# Patient Record
Sex: MALE | Race: WHITE | Employment: OTHER | URBAN - METROPOLITAN AREA
[De-identification: names, ages, dates, MRNs, and addresses within clinical notes are randomized per-mention and may not be internally consistent; named-entity substitution may affect disease eponyms.]

---

## 2017-01-19 ENCOUNTER — OFFICE VISIT (OUTPATIENT)
Dept: TRANSPLANT | Facility: CLINIC | Age: 57
End: 2017-01-19
Attending: INTERNAL MEDICINE
Payer: COMMERCIAL

## 2017-01-19 ENCOUNTER — INFUSION THERAPY VISIT (OUTPATIENT)
Dept: ONCOLOGY | Facility: CLINIC | Age: 57
End: 2017-01-19
Attending: INTERNAL MEDICINE
Payer: COMMERCIAL

## 2017-01-19 VITALS
TEMPERATURE: 98.3 F | BODY MASS INDEX: 28.23 KG/M2 | WEIGHT: 220 LBS | DIASTOLIC BLOOD PRESSURE: 82 MMHG | HEART RATE: 114 BPM | OXYGEN SATURATION: 96 % | SYSTOLIC BLOOD PRESSURE: 133 MMHG | RESPIRATION RATE: 18 BRPM

## 2017-01-19 DIAGNOSIS — C81.90 HODGKIN'S DISEASE (H): ICD-10-CM

## 2017-01-19 DIAGNOSIS — C81.18 NODULAR SCLEROSIS HODGKIN LYMPHOMA OF LYMPH NODES OF MULTIPLE REGIONS (H): Primary | ICD-10-CM

## 2017-01-19 LAB
ALBUMIN SERPL-MCNC: 3.6 G/DL (ref 3.4–5)
ALP SERPL-CCNC: 119 U/L (ref 40–150)
ALT SERPL W P-5'-P-CCNC: 62 U/L (ref 0–70)
ANION GAP SERPL CALCULATED.3IONS-SCNC: 11 MMOL/L (ref 3–14)
AST SERPL W P-5'-P-CCNC: 43 U/L (ref 0–45)
BASOPHILS # BLD AUTO: 0.1 10E9/L (ref 0–0.2)
BASOPHILS NFR BLD AUTO: 0.7 %
BILIRUB SERPL-MCNC: 0.6 MG/DL (ref 0.2–1.3)
BUN SERPL-MCNC: 22 MG/DL (ref 7–30)
CALCIUM SERPL-MCNC: 8.6 MG/DL (ref 8.5–10.1)
CHLORIDE SERPL-SCNC: 108 MMOL/L (ref 94–109)
CO2 SERPL-SCNC: 24 MMOL/L (ref 20–32)
CREAT SERPL-MCNC: 1.05 MG/DL (ref 0.66–1.25)
DIFFERENTIAL METHOD BLD: ABNORMAL
EOSINOPHIL # BLD AUTO: 0 10E9/L (ref 0–0.7)
EOSINOPHIL NFR BLD AUTO: 0.1 %
ERYTHROCYTE [DISTWIDTH] IN BLOOD BY AUTOMATED COUNT: 15.6 % (ref 10–15)
GFR SERPL CREATININE-BSD FRML MDRD: 73 ML/MIN/1.7M2
GLUCOSE SERPL-MCNC: 93 MG/DL (ref 70–99)
HCT VFR BLD AUTO: 40.8 % (ref 40–53)
HGB BLD-MCNC: 14.3 G/DL (ref 13.3–17.7)
IMM GRANULOCYTES # BLD: 0.1 10E9/L (ref 0–0.4)
IMM GRANULOCYTES NFR BLD: 0.6 %
LYMPHOCYTES # BLD AUTO: 3.4 10E9/L (ref 0.8–5.3)
LYMPHOCYTES NFR BLD AUTO: 37.5 %
MCH RBC QN AUTO: 32.1 PG (ref 26.5–33)
MCHC RBC AUTO-ENTMCNC: 35 G/DL (ref 31.5–36.5)
MCV RBC AUTO: 92 FL (ref 78–100)
MONOCYTES # BLD AUTO: 1.1 10E9/L (ref 0–1.3)
MONOCYTES NFR BLD AUTO: 12 %
NEUTROPHILS # BLD AUTO: 4.5 10E9/L (ref 1.6–8.3)
NEUTROPHILS NFR BLD AUTO: 49.1 %
NRBC # BLD AUTO: 0 10*3/UL
NRBC BLD AUTO-RTO: 0 /100
PLATELET # BLD AUTO: 153 10E9/L (ref 150–450)
POTASSIUM SERPL-SCNC: 4.2 MMOL/L (ref 3.4–5.3)
PROT SERPL-MCNC: 7.4 G/DL (ref 6.8–8.8)
RBC # BLD AUTO: 4.46 10E12/L (ref 4.4–5.9)
SODIUM SERPL-SCNC: 143 MMOL/L (ref 133–144)
WBC # BLD AUTO: 9.1 10E9/L (ref 4–11)

## 2017-01-19 PROCEDURE — 96413 CHEMO IV INFUSION 1 HR: CPT

## 2017-01-19 PROCEDURE — 80053 COMPREHEN METABOLIC PANEL: CPT | Performed by: INTERNAL MEDICINE

## 2017-01-19 PROCEDURE — 87799 DETECT AGENT NOS DNA QUANT: CPT | Performed by: INTERNAL MEDICINE

## 2017-01-19 PROCEDURE — 85025 COMPLETE CBC W/AUTO DIFF WBC: CPT | Performed by: INTERNAL MEDICINE

## 2017-01-19 PROCEDURE — 25000128 H RX IP 250 OP 636: Mod: ZF | Performed by: INTERNAL MEDICINE

## 2017-01-19 RX ADMIN — BRENTUXIMAB VEDOTIN 178 MG: 50 INJECTION, POWDER, LYOPHILIZED, FOR SOLUTION INTRAVENOUS at 17:18

## 2017-01-19 RX ADMIN — SODIUM CHLORIDE 250 ML: 9 INJECTION, SOLUTION INTRAVENOUS at 17:18

## 2017-01-19 NOTE — PROGRESS NOTES
[facility] ONCOLOGY CLINIC NOTE      CHIEF COMPLAINT:  The patient reports feeling well.  He did have sharp neurological pain in his forearm about a week ago that resolved after hydrating himself and drinking fluids.  Otherwise, no such recurrent symptoms, and no numbness or tingling.      PATIENT IDENTIFICATION:  Mr. Stoddard is a 56-year-old gentleman with relapsed refractory Hodgkin's lymphoma, status post non-myeloablative double umbilical cord blood transplant on 06/11/2013.  He did have disease recurrence; however, required no specific therapy, and was on a watch-and-wait approach for several years.  Of note, he did have EBV viremia with lymphadenopathy in the early post-transplant period of time, treated with Rituxan on 2 separate occasions with improvement of lymphadenopathy symptoms.  He also had CMV pneumonia in the early post-transplant period of time that was also successfully treated with antiviral therapy.  He had an excisional lymph node biopsy for an enlarging axillary lymph node on 11/14/2016 that confirmed again recurrent Hodgkin's disease.  PET scan demonstrated increased FDG avidity in multiple axillary lymph nodes, the largest measuring between 2.5-3 cm.  In addition, he was noticed to have a subcarinal lymph node that was enlarged with shotty cervical lymphadenopathy or suggestive of systemic disease recurrence.  Subsequently, we made the decision to start him on brentuximab given the increased lymphadenopathy issues, and the first cycle was initiated on 12/05/2016, which he tolerated well (he received the second cycle on 12/29/2016, and today he receives his #3 cycle, which is 01/19/2017).      INTERVAL HISTORY:  Since her last visit, Mr. Stoddard continues to feel well.  He is tolerating brentuximab well without any obvious symptoms of peripheral neuropathy symptoms.  He did have a week ago sharp what felt like radiating neurological pain in his forearm.  However, he was doing active  physical work at that time, and he felt like he did not hydrate himself very well.  After drinking a lot of fluid, the symptoms resolved with no recurrence.  Currently, he reports having no numbness or tingling symptoms. He also reports having no fevers, no chills, and no other infectious signs or symptoms.  He has no GI symptoms either.  He reports having no skin rash, and no pain issue or any bleeding issues.  He does report, however, some hair loss since he started the brentuximab; however, it is not significant.      REVIEW OF SYSTEMS:  A 12-point review of systems was reviewed and was otherwise unremarkable.      PHYSICAL EXAMINATION:   VITAL SIGNS:  Temperature 98.3 Fahrenheit, respiratory rate is 18, his pulse is 114, and his blood pressure is 133/82.  He is saturating 96% on room air.  His weight remains relatively stable at 99.8 kg.   GENERAL:  He is very pleasant, in no apparent distress.   HEAD AND NECK:  Pupils are equally round to light.  Oropharynx is clear without lesions.   CHEST:  Clear to auscultation bilaterally, no wheezes, no crackles.   CARDIAC:  Regular rate and rhythm, no murmurs, rubs or gallops.   CARDIOVASCULAR:  Regular rate and rhythm, no murmurs, rubs or gallops.   ABDOMEN:  Positive bowel sounds, soft, nontender and nondistended.  No palpable hepatosplenomegaly.   EXTREMITIES:  No pedal edema bilaterally.   SKIN:  No rash.   LYMPH NODES:  He has no palpable cervical lymphadenopathy on my examination today.  I am unable to palpate his axillary lymphadenopathy either, which actually is suggestive of a good response to chemotherapy.  He has no palpable inguinal lymph nodes either.   NEUROLOGIC:  Remains grossly intact.      LABORATORY DATA:  Summarized below.  Of note, he has no neutropenia related to brentuximab.   Lab Results   Component Value Date    WBC 9.1 01/19/2017    ANEU 4.5 01/19/2017    HGB 14.3 01/19/2017    HCT 40.8 01/19/2017     01/19/2017     01/19/2017     POTASSIUM 4.2 01/19/2017    CHLORIDE 108 01/19/2017    CO2 24 01/19/2017    GLC 93 01/19/2017    BUN 22 01/19/2017    CR 1.05 01/19/2017    MAG 2.3 09/17/2013    INR 1.05 06/30/2014        ASSESSMENT AND PLAN:  Mr. Stoddard is a 56-year-old gentleman with relapsed refractory Hodgkin's lymphoma, status post  non-myeloablative double cord umbilical cord blood transplant in 2013 with a recurrence of his Hodgkin's lymphoma requiring brentuximab therapy.  At this time, he presents for cycle #3 of therapy.   1.  Relapsed Hodgkin's lymphoma.  The patient is tolerating brentuximab rather well.  He is receiving cycle #3 of treatment today (01/19/2017).  No obvious signs or symptoms of any worsening neuropathy symptoms.  We will plan on obtaining CT imaging after this cycle, and he prefers it to be done when he presented 3 weeks later for his cycle 4 of therapy to make sure he is not taking too much time off from his work.  His EBV titer is pending from today.  Of note, that was elevated when he had evidence of disease progression with increased lymphadenopathy.  I recommended stopping allopurinol since on exam he had a good response to therapy, and no evidence of palpable lymphadenopathy.  His next cycle of #4 brentuximab will be given 3 weeks from now.   2.  Hematology.  His peripheral blood counts are normal, and he is not neutropenic; therefore, it is okay to proceed with his chemotherapy today.   3.  Infectious disease.  He has no active infectious issues at this time.  Previously for EBV viremia, he required Rituxan with good response to treatment, and an EBV titer is pending from today.   4.  Renal.  He had mild STEFANIA prior to starting chemotherapy.  However, this is now resolved.  Therefore, allopurinol is being stopped.  I recommended him increasing his p.o. fluid intake regardless while receiving chemotherapy, in addition when he is going to come for his CT imaging in 3 weeks.   5.  GVHD.  He has no signs or  symptoms of kjlor-oouils-ivuz disease to date.      RTC in 3 weeks for labs, CT scan, cycle #4 of brentuximab and a visit with Karson.      Amy Ty MD     Hematology, Oncology and Transplantation     [facility]

## 2017-01-19 NOTE — Clinical Note
1/19/2017      RE: Brando Stoddard  [address]  [facility] [address]     Dear Colleague,    Thank you for referring your patient, Brando Stoddard, to the [facility] BLOOD [facility]ARR[facility]PL[facility]. Please see a copy of my visit note below.    [facility] ONCOLOGY CLINIC NOTE      CHIEF COMPLAINT:  The patient reports feeling well.  He did have sharp neurological pain in his forearm about a week ago that resolved after hydrating himself and drinking fluids.  Otherwise, no such recurrent symptoms, and no numbness or tingling.      PATIENT IDENTIFICATION:  Mr. Stoddard is a 56-year-old gentleman with relapsed refractory Hodgkin's lymphoma, status post non-myeloablative double umbilical cord blood transplant on 06/11/2013.  He did have disease recurrence; however, required no specific therapy, and was on a watch-and-wait approach for several years.  Of note, he did have EBV viremia with lymphadenopathy in the early post-transplant period of time, treated with Rituxan on 2 separate occasions with improvement of lymphadenopathy symptoms.  He also had CMV pneumonia in the early post-transplant period of time that was also successfully treated with antiviral therapy.  He had an excisional lymph node biopsy for an enlarging axillary lymph node on 11/14/2016 that confirmed again recurrent Hodgkin's disease.  PET scan demonstrated increased FDG avidity in multiple axillary lymph nodes, the largest measuring between 2.5-3 cm.  In addition, he was noticed to have a subcarinal lymph node that was enlarged with shotty cervical lymphadenopathy or suggestive of systemic disease recurrence.  Subsequently, we made the decision to start him on brentuximab given the increased lymphadenopathy issues, and the first cycle was initiated on 12/05/2016, which he tolerated well (he received the second cycle on 12/29/2016, and today he receives his #3 cycle, which is 01/19/2017).      INTERVAL HISTORY:  Since her last visit,   Richi continues to feel well.  He is tolerating brentuximab well without any obvious symptoms of peripheral neuropathy symptoms.  He did have a week ago sharp what felt like radiating neurological pain in his forearm.  However, he was doing active physical work at that time, and he felt like he did not hydrate himself very well.  After drinking a lot of fluid, the symptoms resolved with no recurrence.  Currently, he reports having no numbness or tingling symptoms. He also reports having no fevers, no chills, and no other infectious signs or symptoms.  He has no GI symptoms either.  He reports having no skin rash, and no pain issue or any bleeding issues.  He does report, however, some hair loss since he started the brentuximab; however, it is not significant.      REVIEW OF SYSTEMS:  A 12-point review of systems was reviewed and was otherwise unremarkable.      PHYSICAL EXAMINATION:   VITAL SIGNS:  Temperature 98.3 Fahrenheit, respiratory rate is 18, his pulse is 114, and his blood pressure is 133/82.  He is saturating 96% on room air.  His weight remains relatively stable at 99.8 kg.   GENERAL:  He is very pleasant, in no apparent distress.   HEAD AND NECK:  Pupils are equally round to light.  Oropharynx is clear without lesions.   CHEST:  Clear to auscultation bilaterally, no wheezes, no crackles.   CARDIAC:  Regular rate and rhythm, no murmurs, rubs or gallops.   CARDIOVASCULAR:  Regular rate and rhythm, no murmurs, rubs or gallops.   ABDOMEN:  Positive bowel sounds, soft, nontender and nondistended.  No palpable hepatosplenomegaly.   EXTREMITIES:  No pedal edema bilaterally.   SKIN:  No rash.   LYMPH NODES:  He has no palpable cervical lymphadenopathy on my examination today.  I am unable to palpate his axillary lymphadenopathy either, which actually is suggestive of a good response to chemotherapy.  He has no palpable inguinal lymph nodes either.   NEUROLOGIC:  Remains grossly intact.      LABORATORY  DATA:  Summarized below.  Of note, he has no neutropenia related to brentuximab.   Lab Results   Component Value Date    WBC 9.1 01/19/2017    ANEU 4.5 01/19/2017    HGB 14.3 01/19/2017    HCT 40.8 01/19/2017     01/19/2017     01/19/2017    POTASSIUM 4.2 01/19/2017    CHLORIDE 108 01/19/2017    CO2 24 01/19/2017    GLC 93 01/19/2017    BUN 22 01/19/2017    CR 1.05 01/19/2017    MAG 2.3 09/17/2013    INR 1.05 06/30/2014        ASSESSMENT AND PLAN:  Mr. Stoddard is a 56-year-old gentleman with relapsed refractory Hodgkin's lymphoma, status post  non-myeloablative double cord umbilical cord blood transplant in 2013 with a recurrence of his Hodgkin's lymphoma requiring brentuximab therapy.  At this time, he presents for cycle #3 of therapy.   1.  Relapsed Hodgkin's lymphoma.  The patient is tolerating brentuximab rather well.  He is receiving cycle #3 of treatment today (01/19/2017).  No obvious signs or symptoms of any worsening neuropathy symptoms.  We will plan on obtaining CT imaging after this cycle, and he prefers it to be done when he presented 3 weeks later for his cycle 4 of therapy to make sure he is not taking too much time off from his work.  His EBV titer is pending from today.  Of note, that was elevated when he had evidence of disease progression with increased lymphadenopathy.  I recommended stopping allopurinol since on exam he had a good response to therapy, and no evidence of palpable lymphadenopathy.  His next cycle of #4 brentuximab will be given 3 weeks from now.   2.  Hematology.  His peripheral blood counts are normal, and he is not neutropenic; therefore, it is okay to proceed with his chemotherapy today.   3.  Infectious disease.  He has no active infectious issues at this time.  Previously for EBV viremia, he required Rituxan with good response to treatment, and an EBV titer is pending from today.   4.  Renal.  He had mild STEFANIA prior to starting chemotherapy.  However, this is  now resolved.  Therefore, allopurinol is being stopped.  I recommended him increasing his p.o. fluid intake regardless while receiving chemotherapy, in addition when he is going to come for his CT imaging in 3 weeks.   5.  GVHD.  He has no signs or symptoms of llomr-oacmkg-hpkv disease to date.      RTC in 3 weeks for labs, CT scan, cycle #4 of brentuximab and a visit with Karson.      Amy Ty MD     Hematology, Oncology and Transplantation     [facility]

## 2017-01-19 NOTE — PROGRESS NOTES
Chief Complaint   Patient presents with     Blood Draw     labs drawn preipherally by RN     Labs drawn from right arm AC.  Patient checked in for infusion visit.  Lucila Pelaez RN

## 2017-01-19 NOTE — PROGRESS NOTES
Infusion Nursing Note:  Brando Stoddard presents today for Cycle 3 Day 1 Brentuximab.    Patient seen by provider today: Yes: Dr. Amy Ty in infusion today.    Intravenous Access:  Peripheral IV placed.    Treatment Conditions:  HGB     14.3   1/19/2017  WBC      9.1   1/19/2017   ANEU      4.5   1/19/2017  PLT      153   1/19/2017     NA      143   1/19/2017                POTASSIUM      4.2   1/19/2017        MAG      2.3   9/17/2013         CR     1.05   1/19/2017  CR     1.08   5/15/2013                MEÑO      8.6   1/19/2017             BILITOTAL      0.6   1/19/2017        ALBUMIN      3.6   1/19/2017                 ALT       62   1/19/2017        AST       43   1/19/2017  Results reviewed, labs MET treatment parameters, ok to proceed with treatment.    Post Infusion Assessment:  Patient tolerated infusion without incident.  Blood return noted pre and post infusion.  Access discontinued per protocol.    Discharge Plan:   Patient declined prescription refills.  Discharge instructions reviewed with: Patient.  Patient and/or family verbalized understanding of discharge instructions and all questions answered.  Patient states he will call to schedule next appointment.  Patient discharged in stable condition accompanied by: self.  Departure Mode: Ambulatory.    TODD FLORES RN

## 2017-01-20 LAB
EBV DNA # SPEC NAA+PROBE: ABNORMAL {COPIES}/ML
EBV DNA SPEC NAA+PROBE-LOG#: 4 {LOG_COPIES}/ML

## 2017-02-09 ENCOUNTER — OFFICE VISIT (OUTPATIENT)
Dept: TRANSPLANT | Facility: CLINIC | Age: 57
End: 2017-02-09
Attending: INTERNAL MEDICINE
Payer: COMMERCIAL

## 2017-02-09 ENCOUNTER — INFUSION THERAPY VISIT (OUTPATIENT)
Dept: ONCOLOGY | Facility: CLINIC | Age: 57
End: 2017-02-09
Attending: INTERNAL MEDICINE
Payer: COMMERCIAL

## 2017-02-09 DIAGNOSIS — C81.18 NODULAR SCLEROSIS HODGKIN LYMPHOMA OF LYMPH NODES OF MULTIPLE REGIONS (H): Primary | ICD-10-CM

## 2017-02-09 DIAGNOSIS — C81.04 NODULAR LYMPHOCYTE PREDOMINANT HODGKIN LYMPHOMA OF LYMPH NODES OF AXILLA (H): ICD-10-CM

## 2017-02-09 DIAGNOSIS — J18.9 PNEUMONIA OF BOTH LOWER LOBES DUE TO INFECTIOUS ORGANISM: ICD-10-CM

## 2017-02-09 DIAGNOSIS — C81.04 NODULAR LYMPHOCYTE PREDOMINANT HODGKIN LYMPHOMA OF LYMPH NODES OF AXILLA (H): Primary | ICD-10-CM

## 2017-02-09 LAB
ALBUMIN SERPL-MCNC: 3.6 G/DL (ref 3.4–5)
ALP SERPL-CCNC: 160 U/L (ref 40–150)
ALT SERPL W P-5'-P-CCNC: 49 U/L (ref 0–70)
ANION GAP SERPL CALCULATED.3IONS-SCNC: 11 MMOL/L (ref 3–14)
AST SERPL W P-5'-P-CCNC: 32 U/L (ref 0–45)
BASOPHILS # BLD AUTO: 0 10E9/L (ref 0–0.2)
BASOPHILS NFR BLD AUTO: 0.3 %
BILIRUB SERPL-MCNC: 0.5 MG/DL (ref 0.2–1.3)
BUN SERPL-MCNC: 28 MG/DL (ref 7–30)
CALCIUM SERPL-MCNC: 8.9 MG/DL (ref 8.5–10.1)
CHLORIDE SERPL-SCNC: 106 MMOL/L (ref 94–109)
CO2 SERPL-SCNC: 24 MMOL/L (ref 20–32)
CREAT SERPL-MCNC: 1.23 MG/DL (ref 0.66–1.25)
DIFFERENTIAL METHOD BLD: ABNORMAL
EOSINOPHIL # BLD AUTO: 0 10E9/L (ref 0–0.7)
EOSINOPHIL NFR BLD AUTO: 0 %
ERYTHROCYTE [DISTWIDTH] IN BLOOD BY AUTOMATED COUNT: 16 % (ref 10–15)
GFR SERPL CREATININE-BSD FRML MDRD: 61 ML/MIN/1.7M2
GLUCOSE SERPL-MCNC: 150 MG/DL (ref 70–99)
HCT VFR BLD AUTO: 39 % (ref 40–53)
HGB BLD-MCNC: 13.4 G/DL (ref 13.3–17.7)
IMM GRANULOCYTES # BLD: 0.1 10E9/L (ref 0–0.4)
IMM GRANULOCYTES NFR BLD: 0.7 %
LDH SERPL L TO P-CCNC: 270 U/L (ref 85–227)
LYMPHOCYTES # BLD AUTO: 2 10E9/L (ref 0.8–5.3)
LYMPHOCYTES NFR BLD AUTO: 16.8 %
MCH RBC QN AUTO: 31.8 PG (ref 26.5–33)
MCHC RBC AUTO-ENTMCNC: 34.4 G/DL (ref 31.5–36.5)
MCV RBC AUTO: 92 FL (ref 78–100)
MONOCYTES # BLD AUTO: 0.3 10E9/L (ref 0–1.3)
MONOCYTES NFR BLD AUTO: 2.4 %
NEUTROPHILS # BLD AUTO: 9.5 10E9/L (ref 1.6–8.3)
NEUTROPHILS NFR BLD AUTO: 79.8 %
NRBC # BLD AUTO: 0 10*3/UL
NRBC BLD AUTO-RTO: 0 /100
PLATELET # BLD AUTO: 185 10E9/L (ref 150–450)
POTASSIUM SERPL-SCNC: 4.4 MMOL/L (ref 3.4–5.3)
PROT SERPL-MCNC: 7.9 G/DL (ref 6.8–8.8)
RBC # BLD AUTO: 4.22 10E12/L (ref 4.4–5.9)
SODIUM SERPL-SCNC: 142 MMOL/L (ref 133–144)
WBC # BLD AUTO: 11.9 10E9/L (ref 4–11)

## 2017-02-09 PROCEDURE — 85025 COMPLETE CBC W/AUTO DIFF WBC: CPT | Performed by: INTERNAL MEDICINE

## 2017-02-09 PROCEDURE — 36415 COLL VENOUS BLD VENIPUNCTURE: CPT | Performed by: INTERNAL MEDICINE

## 2017-02-09 PROCEDURE — 87633 RESP VIRUS 12-25 TARGETS: CPT | Performed by: INTERNAL MEDICINE

## 2017-02-09 PROCEDURE — 83615 LACTATE (LD) (LDH) ENZYME: CPT | Performed by: INTERNAL MEDICINE

## 2017-02-09 PROCEDURE — 80053 COMPREHEN METABOLIC PANEL: CPT | Performed by: INTERNAL MEDICINE

## 2017-02-09 RX ORDER — LEVOFLOXACIN 750 MG/1
750 TABLET, FILM COATED ORAL DAILY
Qty: 7 TABLET | Refills: 0 | Status: SHIPPED | OUTPATIENT
Start: 2017-02-09 | End: 2017-03-16

## 2017-02-09 NOTE — PROGRESS NOTES
Infusion Nursing Note:  Brando Stoddard presents today for cycle 4 day 1 Brentuximab - Deferred.    Patient seen by provider today: Yes: Dr. Ty   present during visit today: Not Applicable.    Note: Pt reports having an upper respiratory infection that he was seen for on 2/7/17. He was started on prednisone, an albuterol inhaler, azithromycin, and codeine cough syrup.    TORB: Dr. Ty/Stephanie Monique RN: Defer chemotherapy for 2 weeks, nasal swab for RSV, pt okay to discharge to home.    Treatment Conditions:  HGB     13.4   2/9/2017  WBC     11.9   2/9/2017   ANEU      9.5   2/9/2017  PLT      185   2/9/2017     NA      142   2/9/2017                POTASSIUM      4.4   2/9/2017        MAG      2.3   9/17/2013         CR     1.23   2/9/2017  CR     1.08   5/15/2013                MEÑO      8.9   2/9/2017             BILITOTAL      0.5   2/9/2017        ALBUMIN      3.6   2/9/2017                 ALT       49   2/9/2017        AST       32   2/9/2017    Discharge Plan:   Patient declined prescription refills.  Discharge instructions reviewed with: Patient.  Patient and/or family verbalized understanding of discharge instructions and all questions answered.  AVS to patient via [employer].  Patient will return 2/23/17 for next appointment.   Patient discharged in stable condition accompanied by: wife.  Departure Mode: Ambulatory.    Stephanie Monique RN

## 2017-02-09 NOTE — LETTER
2/9/2017      RE: Brando Stoddard  [address]  [place] [address]     Dear Colleague,    Thank you for referring your patient, Brando Stoddard, to the [facility] BLOOD AND MARROW TRANSPLANT. Please see a copy of my visit note below.    [facility] ONCOLOGY CLINIC NOTE       CHIEF COMPLAINT:  The patient reports having hair loss.  In addition, he has been having URI symptoms and productive cough for several days.       PATIENT IDENTIFICATION:  Mr. 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  He had post-transplant relapse early on.  However, we opted to do a watch-and-wait approach for several years because his lymphadenopathy was relatively stable.  At the same time, he was noticed to have EBV viremia and previously on two separate occasions he was treated with a Rituxan course with improvement of lymphadenopathy and resolution of EBV viremia symptoms.  Most recently on a PET/CT he was noticed to have a further enlargement of lymphadenopathy with axillary lymph nodes measuring 2.5-3 cm.  In addition, he was noticed to have subcarinal lymph nodes with also shotty cervical lymphadenopathy suggestive of systemic disease recurrence.  Brentuximab was initiated on 12/05/2016, the second cycle was on 12/29/2016, and the last one was on 01/19/2017.  He had restaging CT imaging today and presents for a followup visit and to start cycle number 4.       INTERVAL HISTORY:  Since his last visit, Mr. Stoddard for the last several days has been having URI symptoms as well as chest congestion with a productive cough.  He was seen in [facility] and was prescribed prednisone and was started on Zithromax for suspected bronchitis.  Chest x-ray showed no pneumonia  there.  On today's evaluation he reports that several  family members had viral URI symptoms as well which resolved in those patients.  However, he now is sick with a productive cough, no fevers; however, he has had exertional shortness of breath.  He reports having no chills.  He has no GI symptoms and no peripheral neuropathy symptoms, either.  CT chest today showed ground-glass opacities bilaterally with atypical infection being suspected.  The patient reports having no bleeding issues and no pain issues.       REVIEW OF SYSTEMS:  A 12-point review of systems was reviewed and was otherwise unremarkable other than reported in chief complaint and interval history.       PHYSICAL EXAMINATION:   VITAL SIGNS:  His temperature is 98.3 Fahrenheit, respiratory rate is 18, heart rate 114, blood pressure 133/82, saturating 96% in room air.   GENERAL:  He is very pleasant, in no apparent distress.   HEAD AND NECK:  Pupils are equally round to light.  Oropharynx is clear without lesions.   CARDIAC:  Regular rate and rhythm.  No murmurs, rubs or gallops.   ABDOMEN:  Positive bowel sounds, soft, nontender, nondistended, without palpable hepatosplenomegaly.   EXTREMITIES:  No pedal edema bilaterally.   LYMPH NODES:  I was unable to palpate his cervical or axillary lymphadenopathy today.  He also has no palpable inguinal lymph nodes.   NEUROLOGIC:  Nonfocal.       LABORATORY DATA:  Summarized below.  He was noticed to have slight leukocytosis which is likely related to prednisone use.      ASSESSMENT AND PLAN:  Mr. Stoddard is a 56-year-old gentleman with relapsed Hodgkin lymphoma, status post non-myeloablative double umbilical cord blood transplant in 2013 who is being treated with brentuximab and has completed 3 cycles of therapy so far.    1.  Relapsed Hodgkin lymphoma.  He completed 3 cycles of chemotherapy, the last one on 01/19/2017.  His interim imaging demonstrates reduced left axillary lymphadenopathy corresponding to treatment response.  No lymphadenopathy otherwise was  followed in the reminder of the chest other than cervical lymphadenopathy now being decreased down to 1 cm.  He was noticed to have nodular opacities in his lungs with mucous plugging and bronchial thickening suggestive of atypical infection.  I informed the patient that based on this interim imaging, it is clear that he is having a good response to brentuximab.  However, we will hold cycle number 4 of brentuximab therapy today due to ongoing infectious issues.  We will reschedule it 2 weeks from now, whenever his infection is treated and controlled.    2.  Hematology.  His peripheral blood counts are normal other than leukocytosis with neutrophilia, likely related to the use of prednisone or possible underlying bacterial infection.    3.  Infectious disease.  For his symptoms of atypical pneumonia which most likely is viral in etiology, we did a nasopharyngeal swab today for viral PCR testing.  In addition, I switched him from Zithromax to Levaquin for antibacterial coverage to preemptively treat possible superimposed bacterial pneumonia.  The patient was informed to contact us if his symptoms do not improve.    4.  Renal.  He had mild STEFANIA prior to starting chemotherapy.  However, this is resolved and he continues to drink a good amount of fluids.    5.  Rvbqj-imerii-rokf disease.  He has no signs or symptoms of wmgkv-zzbsak-clyt disease to date.       RTC in 2 weeks with labs and visit with Dr. Ty.  If he is doing well, we will start cycle number 4 of brentuximab at that time.         Again, thank you for allowing me to participate in the care of your patient.      Sincerely,    Amy Ty MD

## 2017-02-09 NOTE — PATIENT INSTRUCTIONS
Contact Numbers    [facility] [place]: [phone]  [facility] [place]:  [phone]    Call triage with chills and/or temperature greater than or equal to 100.5, uncontrolled nausea/vomiting, diarrhea, constipation, dizziness, shortness of breath, chest pain, bleeding, unexplained bruising, or any new/concerning symptoms, questions/concerns.     If you are having any concerning symptoms or wish to speak to a provider before your next infusion visit, please call your care coordinator or triage to notify them so we can adequately serve you.       After Hours: [phone]    If after hours, weekends, or holidays, call main hospital  and ask for Oncology doctor on call.         February 2017 Sunday Monday Tuesday Wednesday Thursday Friday Saturday                  1     2     3     4       5     6     7     8     9     CT CHEST ABDOMEN PELVIS WWO    3:00 PM   (20 min.)   UCCT2   [facility] CT     LAB WITH  CLINIC    3:30 PM   (15 min.)    LAB   [facility] Lab     [facility] ONC RETURN    4:30 PM   (30 min.)   Amy Ty MD   [facility] Blood and Marrow Transplant     P ONC INFUSION 60    4:30 PM   (60 min.)    ONCOLOGY INFUSION   [facility] 10     11       12     13     14     15     16     17     18       19     20     21     22     23     [facility] DRAW    2:00 PM   (15 min.)    [facility] DRAW   [facility] Lab Draw     [facility] ONC RETURN    2:30 PM   (30 min.)   Amy Ty MD   [facility] Blood and Marrow Transplant     P ONC INFUSION 60    3:00 PM   (60 min.)    ONCOLOGY INFUSION   [facility] 24     25       26     27     28                                    March 2017 Sunday Monday Tuesday Wednesday Thursday Friday Saturday                  1     2     [facility] DRAW    9:00 AM   (15 min.)    [facility] DRAW   [facility] Lab Draw     [facility] RETURN    9:30 AM   (50 min.)   Juliane Duron PA-C   [facility] ONC INFUSION 60   10:30 AM   (60 min.)    ONCOLOGY INFUSION   [facility] 3     4       5     6     7     8     9     10     11       12     13     14     15     16     17     18       19     20     21     22     23     [facility] DRAW    2:00 PM   (15 min.)   [facility] LAB DRAW   [facility] Lab Draw     [facility] ONC RETURN    2:30 PM   (30 min.)   Amy Ty MD   [facility] Blood and Marrow Transplant     [facility] ONC INFUSION 60    3:00 PM   (60 min.)    ONCOLOGY INFUSION   [facility] 24     25       26     27     28     29     30     31                       Lab Results:  Recent Results (from the past 12 hour(s))   CBC with platelets differential    Collection Time: 02/09/17  3:04 PM   Result Value Ref Range    WBC 11.9 (H) 4.0 - 11.0 10e9/L    RBC Count 4.22 (L) 4.4 - 5.9 10e12/L    Hemoglobin 13.4 13.3 - 17.7 g/dL    Hematocrit 39.0 (L) 40.0 - 53.0 %    MCV 92 78 - 100 fl    MCH 31.8 26.5 - 33.0 pg    MCHC 34.4 31.5 - 36.5 g/dL    RDW 16.0 (H) 10.0 - 15.0 %    Platelet Count 185 150 - 450 10e9/L    Diff Method Automated Method     % Neutrophils 79.8 %    % Lymphocytes 16.8 %    % Monocytes 2.4 %    % Eosinophils 0.0 %    % Basophils 0.3 %    % Immature Granulocytes 0.7 %    Nucleated RBCs 0 0 /100    Absolute Neutrophil 9.5 (H) 1.6 - 8.3 10e9/L    Absolute Lymphocytes 2.0 0.8 - 5.3 10e9/L    Absolute Monocytes 0.3 0.0 - 1.3 10e9/L    Absolute Eosinophils 0.0 0.0 - 0.7 10e9/L    Absolute Basophils 0.0 0.0 - 0.2 10e9/L    Abs Immature Granulocytes 0.1 0 - 0.4 10e9/L    Absolute Nucleated RBC 0.0    Comprehensive metabolic panel    Collection Time: 02/09/17  3:04 PM   Result Value Ref Range    Sodium 142 133 - 144 mmol/L    Potassium 4.4 3.4 - 5.3 mmol/L    Chloride 106 94 - 109 mmol/L    Carbon Dioxide 24 20 - 32 mmol/L    Anion Gap 11 3 - 14 mmol/L    Glucose 150 (H) 70 - 99 mg/dL    Urea Nitrogen 28 7 - 30 mg/dL    Creatinine 1.23 0.66 - 1.25 mg/dL     GFR Estimate 61 >60 mL/min/1.7m2    GFR Estimate If Black 73 >60 mL/min/1.7m2    Calcium 8.9 8.5 - 10.1 mg/dL    Bilirubin Total 0.5 0.2 - 1.3 mg/dL    Albumin 3.6 3.4 - 5.0 g/dL    Protein Total 7.9 6.8 - 8.8 g/dL    Alkaline Phosphatase 160 (H) 40 - 150 U/L    ALT 49 0 - 70 U/L    AST 32 0 - 45 U/L   Lactate Dehydrogenase    Collection Time: 02/09/17  3:04 PM   Result Value Ref Range    Lactate Dehydrogenase 270 (H) 85 - 227 U/L

## 2017-02-09 NOTE — MR AVS SNAPSHOT
After Visit Summary   2/9/2017    Brando Stoddard    MRN: [id number]           Patient Information     Date Of Birth          1960        Visit Information        Provider Department      2/9/2017 4:30 PM Amy Ty MD 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  After hours call the   hospital at [phone] or   [phone]. Ask for the BMT   fellow for pediatric or adult patients            Follow-ups after your visit        Your next 10 appointments already scheduled     Feb 23, 2017  2:00 PM   [facility] Draw with  [facility] DRAW   [facility] Draw ([facility])    [address] [place] [id number]   [phone]            Feb 23, 2017  2:30 PM   RETURN ONC with Amy Ty MD   [facility] Blood and Marrow Transplant ([facility])    [address] [place] [id number]   [phone]            Feb 23, 2017  3:00 PM   Infusion 60 with UC ONCOLOGY INFUSION, UC 15 ATC   [facility] ([facility])    [address] [place] [phone]   [phone]            Mar 02, 2017  9:00 AM   [facility] Draw with UC [facility] DRAW   [facility] Draw ([facility])    [address] [place] [phone]   [phone]            Mar 02, 2017  9:30 AM   (Arrive by 9:15 AM)   Return Visit with Juliane Duron PA-C   [facility]  [facility] ([facility])    [address] [facility]  [address] [facility] [address]   [phone]            Mar 02, 2017 10:30 AM   Infusion 60 with UC ONCOLOGY INFUSION, UC 31 ATC   [facility] ([facility])    [address] [place] [id number]   [phone]            Mar 23, 2017  2:00 PM   [facility] Draw with UC [facility] DRAW   [facility] Lab Draw ([facility])    [address] [place] [id number]   [phone]            Mar 23, 2017  2:30 PM   RETURN ONC with Amy Ty MD   [facility] Blood and Marrow Transplant ([facility])    [address] [place] [id number] [phone]              Who to contact     If you have questions or need follow up information about today's clinic visit or your schedule please contact [facility] directly at [phone].  Normal or non-critical lab and imaging results will be communicated to you by [employer], letter or phone within 4 business days after the clinic has received the results. If you do not hear from us within 7 days, please contact the clinic through [facility] or phone. If you have a critical or abnormal lab result, we will notify you by phone as soon as possible.  Submit refill requests through "[employer] or call your pharmacy and they will forward the refill request to us. Please allow 3 business days for your refill to be completed.          Additional Information About Your Visit        [employer] Information     "[employer] gives you secure access to your electronic health record. If you see a primary care provider, you can also send messages to your care team and make appointments. If you have questions, please call your primary care clinic.  If you do not have a primary care provider, please call [phone] and they will  assist you.        Care EveryWhere ID     This is your Care EveryWhere ID. This could be used by other organizations to access your [place] medical records  [id number]         Blood Pressure from Last 3 Encounters:   01/19/17 133/82   12/29/16 134/91   12/15/16 130/83    Weight from Last 3 Encounters:   01/19/17 99.791 kg (220 lb)   12/29/16 100.064 kg (220 lb 9.6 oz)   12/15/16 99.7 kg (219 lb 12.8 oz)              We Performed the Following     Respiratory Virus Panel by PCR          Today's Medication Changes          These changes are accurate as of: 2/9/17  5:32 PM.  If you have any questions, ask your nurse or doctor.               Start taking these medicines.        Dose/Directions    levofloxacin 750 MG tablet   Commonly known as:  LEVAQUIN   Used for:  Pneumonia of both lower lobes due to infectious organism   Started by:  Amy Ty MD        Dose:  750 mg   Take 1 tablet (750 mg) by mouth daily   Quantity:  7 tablet   Refills:  0         Stop taking these medicines if you haven't already. Please contact your care team if you have questions.     ZITHROMAX Z-ERNST PO   Stopped by:  Amy Ty MD                Where to get your medicines      These medications were sent to [facility] [address] [place] [address] [place] [address] [place] AT [facility] [address]  [address] [place] [address]     Phone:  [phone]    - levofloxacin 750 MG tablet             Recent Review Flowsheet Data     BMT Recent Results Latest Ref Rng 11/29/2016 12/1/2016 12/8/2016 12/15/2016 12/29/2016 1/19/2017 2/9/2017    WBC 4.0 - 11.0 10e9/L - 9.0 7.7 5.5 7.8 9.1 11.9(H)    Hemoglobin 13.3 - 17.7 g/dL - 13.9 14.8 14.4 15.0 14.3 13.4    Platelet Count 150 - 450 10e9/L - 133(L) 132(L) 86(L) 135(L) 153 185    Neutrophils (Absolute) 1.6 - 8.3 10e9/L - 5.5 4.0 2.6 3.0 4.5 9.5(H)    Sodium 133 - 144 mmol/L - 143 - 140 140 143 142    Potassium 3.4 - 5.3 mmol/L - 3.7 - 3.4 4.0 4.2 4.4    Chloride 94 - 109  mmol/L - 109 - 108 107 108 106    Glucose 70 - 99 mg/dL 91 126(H) - 127(H) 113(H) 93 150(H)    Urea Nitrogen 7 - 30 mg/dL - 22 - 13 24 22 28    Creatinine 0.66 - 1.25 mg/dL - 1.31(H) - 1.04 1.20 1.05 1.23    Calcium (Total) 8.5 - 10.1 mg/dL - 8.4(L) - 9.0 8.4(L) 8.6 8.9    Protein (Total) 6.8 - 8.8 g/dL - 7.4 - 7.5 7.5 7.4 7.9    Albumin 3.4 - 5.0 g/dL - 3.7 - 3.4 3.6 3.6 3.6    Alkaline Phosphatase 40 - 150 U/L - 119 - 142 131 119 160(H)    AST 0 - 45 U/L - 33 - 106(H) 37 43 32    ALT 0 - 70 U/L - 36 - 128(H) 68 62 49    MCV 78 - 100 fl - 91 93 91 91 92 92               Primary Care Provider    Md Other Clinic                Thank you!     Thank you for choosing [facility]  for your care. Our goal is always to provide you with excellent care. Hearing back from our patients is one way we can continue to improve our services. Please take a few minutes to complete the written survey that you may receive in the mail after your visit with us. Thank you!             Your Updated Medication List - Protect others around you: Learn how to safely use, store and throw away your medicines at www.disposemymeds.org.          This list is accurate as of: 2/9/17  5:33 PM.  Always use your most recent med list.                   Brand Name Dispense Instructions for use    ALBUTEROL SULFATE HFA IN          levofloxacin 750 MG tablet    LEVAQUIN    7 tablet    Take 1 tablet (750 mg) by mouth daily       PREDNISONE PO      Take 40 mg by mouth daily       ROBITUSSIN A-C OR

## 2017-02-10 LAB
FLUAV H1 2009 PAND RNA SPEC QL NAA+PROBE: NEGATIVE
FLUAV H1 RNA SPEC QL NAA+PROBE: NEGATIVE
FLUAV H3 RNA SPEC QL NAA+PROBE: NEGATIVE
FLUAV RNA SPEC QL NAA+PROBE: NEGATIVE
FLUBV RNA SPEC QL NAA+PROBE: NEGATIVE
HADV DNA SPEC QL NAA+PROBE: NEGATIVE
HADV DNA SPEC QL NAA+PROBE: NEGATIVE
HMPV RNA SPEC QL NAA+PROBE: NEGATIVE
HPIV1 RNA SPEC QL NAA+PROBE: NEGATIVE
HPIV2 RNA SPEC QL NAA+PROBE: NEGATIVE
HPIV3 RNA SPEC QL NAA+PROBE: NEGATIVE
MICROBIOLOGIST REVIEW: ABNORMAL
RHINOVIRUS RNA SPEC QL NAA+PROBE: ABNORMAL
RSV RNA SPEC QL NAA+PROBE: NEGATIVE
RSV RNA SPEC QL NAA+PROBE: NEGATIVE
SPECIMEN SOURCE: ABNORMAL

## 2017-02-10 NOTE — PROGRESS NOTES
[facility] ONCOLOGY CLINIC NOTE       CHIEF COMPLAINT:  The patient reports having hair loss.  In addition, he has been having URI symptoms and productive cough for several days.       PATIENT IDENTIFICATION:  Mr. 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  He had post-transplant relapse early on.  However, we opted to do a watch-and-wait approach for several years because his lymphadenopathy was relatively stable.  At the same time, he was noticed to have EBV viremia and previously on two separate occasions he was treated with a Rituxan course with improvement of lymphadenopathy and resolution of EBV viremia symptoms.  Most recently on a PET/CT he was noticed to have a further enlargement of lymphadenopathy with axillary lymph nodes measuring 2.5-3 cm.  In addition, he was noticed to have subcarinal lymph nodes with also shotty cervical lymphadenopathy suggestive of systemic disease recurrence.  Brentuximab was initiated on 12/05/2016, the second cycle was on 12/29/2016, and the last one was on 01/19/2017.  He had restaging CT imaging today and presents for a followup visit and to start cycle number 4.       INTERVAL HISTORY:  Since his last visit, Mr. Stoddard for the last several days has been having URI symptoms as well as chest congestion with a productive cough.  He was seen in [facility] and was prescribed prednisone and was started on Zithromax for suspected bronchitis.  Chest x-ray showed no pneumonia  there.  On today's evaluation he reports that several family members had viral URI symptoms as well which resolved in those patients.  However, he now is sick with a productive cough, no fevers; however, he has had exertional shortness of breath.  He reports having no chills.  He has no GI symptoms and no  peripheral neuropathy symptoms, either.  CT chest today showed ground-glass opacities bilaterally with atypical infection being suspected.  The patient reports having no bleeding issues and no pain issues.       REVIEW OF SYSTEMS:  A 12-point review of systems was reviewed and was otherwise unremarkable other than reported in chief complaint and interval history.       PHYSICAL EXAMINATION:   VITAL SIGNS:  His temperature is 98.3 Fahrenheit, respiratory rate is 18, heart rate 114, blood pressure 133/82, saturating 96% in room air.   GENERAL:  He is very pleasant, in no apparent distress.   HEAD AND NECK:  Pupils are equally round to light.  Oropharynx is clear without lesions.   CARDIAC:  Regular rate and rhythm.  No murmurs, rubs or gallops.   ABDOMEN:  Positive bowel sounds, soft, nontender, nondistended, without palpable hepatosplenomegaly.   EXTREMITIES:  No pedal edema bilaterally.   LYMPH NODES:  I was unable to palpate his cervical or axillary lymphadenopathy today.  He also has no palpable inguinal lymph nodes.   NEUROLOGIC:  Nonfocal.       LABORATORY DATA:  Summarized below.  He was noticed to have slight leukocytosis which is likely related to prednisone use.      ASSESSMENT AND PLAN:  Mr. Stoddard is a 56-year-old gentleman with relapsed Hodgkin lymphoma, status post non-myeloablative double umbilical cord blood transplant in 2013 who is being treated with brentuximab and has completed 3 cycles of therapy so far.    1.  Relapsed Hodgkin lymphoma.  He completed 3 cycles of chemotherapy, the last one on 01/19/2017.  His interim imaging demonstrates reduced left axillary lymphadenopathy corresponding to treatment response.  No lymphadenopathy otherwise was followed in the reminder of the chest other than cervical lymphadenopathy now being decreased down to 1 cm.  He was noticed to have nodular opacities in his lungs with mucous plugging and bronchial thickening suggestive of atypical infection.  I  informed the patient that based on this interim imaging, it is clear that he is having a good response to brentuximab.  However, we will hold cycle number 4 of brentuximab therapy today due to ongoing infectious issues.  We will reschedule it 2 weeks from now, whenever his infection is treated and controlled.    2.  Hematology.  His peripheral blood counts are normal other than leukocytosis with neutrophilia, likely related to the use of prednisone or possible underlying bacterial infection.    3.  Infectious disease.  For his symptoms of atypical pneumonia which most likely is viral in etiology, we did a nasopharyngeal swab today for viral PCR testing.  In addition, I switched him from Zithromax to Levaquin for antibacterial coverage to preemptively treat possible superimposed bacterial pneumonia.  The patient was informed to contact us if his symptoms do not improve.    4.  Renal.  He had mild STEFANIA prior to starting chemotherapy.  However, this is resolved and he continues to drink a good amount of fluids.    5.  Lgpai-veypzw-zkky disease.  He has no signs or symptoms of jdcyo-vlqzrk-gzox disease to date.       RTC in 2 weeks with labs and visit with Dr. Ty.  If he is doing well, we will start cycle number 4 of brentuximab at that time.

## 2017-02-23 ENCOUNTER — OFFICE VISIT (OUTPATIENT)
Dept: TRANSPLANT | Facility: CLINIC | Age: 57
End: 2017-02-23
Attending: INTERNAL MEDICINE
Payer: COMMERCIAL

## 2017-02-23 ENCOUNTER — INFUSION THERAPY VISIT (OUTPATIENT)
Dept: ONCOLOGY | Facility: CLINIC | Age: 57
End: 2017-02-23
Attending: INTERNAL MEDICINE
Payer: COMMERCIAL

## 2017-02-23 ENCOUNTER — APPOINTMENT (OUTPATIENT)
Dept: LAB | Facility: CLINIC | Age: 57
End: 2017-02-23
Attending: INTERNAL MEDICINE
Payer: COMMERCIAL

## 2017-02-23 VITALS
RESPIRATION RATE: 18 BRPM | WEIGHT: 214.9 LBS | OXYGEN SATURATION: 100 % | TEMPERATURE: 97.7 F | BODY MASS INDEX: 27.58 KG/M2 | DIASTOLIC BLOOD PRESSURE: 78 MMHG | HEART RATE: 87 BPM | SYSTOLIC BLOOD PRESSURE: 117 MMHG

## 2017-02-23 DIAGNOSIS — C81.18 NODULAR SCLEROSIS HODGKIN LYMPHOMA OF LYMPH NODES OF MULTIPLE REGIONS (H): Primary | ICD-10-CM

## 2017-02-23 DIAGNOSIS — C81.04 NODULAR LYMPHOCYTE PREDOMINANT HODGKIN LYMPHOMA OF LYMPH NODES OF AXILLA (H): Primary | ICD-10-CM

## 2017-02-23 DIAGNOSIS — C81.18 NODULAR SCLEROSIS HODGKIN LYMPHOMA OF LYMPH NODES OF MULTIPLE REGIONS (H): ICD-10-CM

## 2017-02-23 LAB
BASOPHILS # BLD AUTO: 0 10E9/L (ref 0–0.2)
BASOPHILS NFR BLD AUTO: 0.4 %
DIFFERENTIAL METHOD BLD: ABNORMAL
EOSINOPHIL # BLD AUTO: 0.2 10E9/L (ref 0–0.7)
EOSINOPHIL NFR BLD AUTO: 2.7 %
ERYTHROCYTE [DISTWIDTH] IN BLOOD BY AUTOMATED COUNT: 15.4 % (ref 10–15)
HCT VFR BLD AUTO: 38 % (ref 40–53)
HGB BLD-MCNC: 12.7 G/DL (ref 13.3–17.7)
IMM GRANULOCYTES # BLD: 0 10E9/L (ref 0–0.4)
IMM GRANULOCYTES NFR BLD: 0.4 %
LYMPHOCYTES # BLD AUTO: 2.5 10E9/L (ref 0.8–5.3)
LYMPHOCYTES NFR BLD AUTO: 34.4 %
MCH RBC QN AUTO: 32.4 PG (ref 26.5–33)
MCHC RBC AUTO-ENTMCNC: 33.4 G/DL (ref 31.5–36.5)
MCV RBC AUTO: 97 FL (ref 78–100)
MONOCYTES # BLD AUTO: 0.6 10E9/L (ref 0–1.3)
MONOCYTES NFR BLD AUTO: 8.7 %
NEUTROPHILS # BLD AUTO: 4 10E9/L (ref 1.6–8.3)
NEUTROPHILS NFR BLD AUTO: 53.4 %
NRBC # BLD AUTO: 0 10*3/UL
NRBC BLD AUTO-RTO: 0 /100
PLATELET # BLD AUTO: 133 10E9/L (ref 150–450)
RBC # BLD AUTO: 3.92 10E12/L (ref 4.4–5.9)
WBC # BLD AUTO: 7.4 10E9/L (ref 4–11)

## 2017-02-23 PROCEDURE — 85025 COMPLETE CBC W/AUTO DIFF WBC: CPT | Performed by: INTERNAL MEDICINE

## 2017-02-23 PROCEDURE — 96413 CHEMO IV INFUSION 1 HR: CPT

## 2017-02-23 PROCEDURE — 40000141 ZZH STATISTIC PERIPHERAL IV START W/O US GUIDANCE: Mod: ZF

## 2017-02-23 PROCEDURE — 25000128 H RX IP 250 OP 636: Mod: ZF | Performed by: INTERNAL MEDICINE

## 2017-02-23 PROCEDURE — 36415 COLL VENOUS BLD VENIPUNCTURE: CPT

## 2017-02-23 RX ORDER — MEPERIDINE HYDROCHLORIDE 25 MG/ML
25 INJECTION INTRAMUSCULAR; INTRAVENOUS; SUBCUTANEOUS EVERY 30 MIN PRN
Status: CANCELLED | OUTPATIENT
Start: 2017-02-23

## 2017-02-23 RX ORDER — LORAZEPAM 2 MG/ML
0.5 INJECTION INTRAMUSCULAR EVERY 4 HOURS PRN
Status: CANCELLED
Start: 2017-02-23

## 2017-02-23 RX ORDER — ALBUTEROL SULFATE 0.83 MG/ML
2.5 SOLUTION RESPIRATORY (INHALATION)
Status: CANCELLED | OUTPATIENT
Start: 2017-02-23

## 2017-02-23 RX ORDER — DIPHENHYDRAMINE HCL 25 MG
50 CAPSULE ORAL
Status: CANCELLED
Start: 2017-02-23

## 2017-02-23 RX ORDER — ALBUTEROL SULFATE 90 UG/1
1-2 AEROSOL, METERED RESPIRATORY (INHALATION)
Status: CANCELLED
Start: 2017-02-23

## 2017-02-23 RX ORDER — METHYLPREDNISOLONE SODIUM SUCCINATE 125 MG/2ML
125 INJECTION, POWDER, LYOPHILIZED, FOR SOLUTION INTRAMUSCULAR; INTRAVENOUS
Status: CANCELLED
Start: 2017-02-23

## 2017-02-23 RX ORDER — DIPHENHYDRAMINE HYDROCHLORIDE 50 MG/ML
50 INJECTION INTRAMUSCULAR; INTRAVENOUS
Status: CANCELLED
Start: 2017-02-23

## 2017-02-23 RX ORDER — SODIUM CHLORIDE 9 MG/ML
1000 INJECTION, SOLUTION INTRAVENOUS CONTINUOUS PRN
Status: CANCELLED
Start: 2017-02-23

## 2017-02-23 RX ORDER — EPINEPHRINE 0.3 MG/.3ML
0.3 INJECTION SUBCUTANEOUS EVERY 5 MIN PRN
Status: CANCELLED | OUTPATIENT
Start: 2017-02-23

## 2017-02-23 RX ORDER — ACETAMINOPHEN 325 MG/1
650 TABLET ORAL
Status: CANCELLED
Start: 2017-02-23

## 2017-02-23 RX ADMIN — BRENTUXIMAB VEDOTIN 178 MG: 50 INJECTION, POWDER, LYOPHILIZED, FOR SOLUTION INTRAVENOUS at 15:27

## 2017-02-23 RX ADMIN — SODIUM CHLORIDE 250 ML: 9 INJECTION, SOLUTION INTRAVENOUS at 15:26

## 2017-02-23 ASSESSMENT — PAIN SCALES - GENERAL: PAINLEVEL: NO PAIN (0)

## 2017-02-23 NOTE — PROGRESS NOTES
[facility] ONCOLOGY CLINIC NOTE      CHIEF COMPLAINT:  The patient reports feeling better.  His URI symptoms are resolved, and he no longer has shortness of breath.      PATIENT IDENTIFICATION:  Mr. 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  However, most recently he was noticed to have increasing axillary lymphadenopathy with lymph nodes measuring approximately 2.5-3 cm, and he was also noticed to have subcarinal lymph nodes, and we decided for the progressive disease to start him on brentuximab course of chemotherapy with the first cycle being administered on 12/05/2016, the second one on 12/29/2016, and the third cycle on 01/19/2017.  Restaging imaging after the third cycle of chemotherapy demonstrated a positive response to treatment with axillary lymph node maximal measuring 1 cm, and now he presents for cycle #4 (fourth cycle was delayed due to rhinovirus infection and bronchitis).      INTERVAL HISTORY:  Since his last visit 2 weeks ago when Mr. Stoddard was noticed to have rhinovirus URI symptoms and suspected bacterial bronchitis that was a superimposed viral infection for which he completed a course of Levaquin, he had improvement of his symptoms. In fact, his URI symptoms are resolved, and he no longer has shortness of breath.  He presents today to start cycle #4 of brentuximab chemotherapy.  From a clinical standpoint, it does not appear like he has any contraindications to proceed.  He reports having no peripheral neuropathy symptoms either.  He has not had fevers or chills, and again no shortness of breath.   He has had no bleeding complications, and no issues with pain.  He reports having no enlarging lymphadenopathy either.      REVIEW OF SYSTEMS:  A 12-point review of systems was reviewed and was otherwise unremarkable.      PHYSICAL EXAMINATION:   VITAL SIGNS:  Today show a temperature of 97.7 Fahrenheit, respiratory rate is 18, heart rate is 87, blood pressure 117/78.  He is saturating 100% on room air.   GENERAL:  He is in no apparent distress, very pleasant.   HEAD AND NECK:  Pupils are equally round to light.  Oropharynx is clear without lesions.   CARDIAC:  Regular rate and rhythm, no murmurs, rubs or gallops.   ABDOMEN:  He has positive bowel sounds in all 4 quadrants.  Abdomen was soft, nontender and nondistended, and he had no palpable hepatosplenomegaly.   EXTREMITIES:  No pedal edema bilaterally.   LYMPH NODES:  On my physical exam today, he had no palpable cervical, axillary or inguinal lymphadenopathy bilaterally.   NEUROLOGIC:  Grossly intact.      LABORATORY DATA:  His leukocytosis is resolved, which was previously attributed to recent prednisone use, as well as possibly superimposed bacterial active infection.      Lab Results   Component Value Date    WBC 7.4 02/23/2017    ANEU 4.0 02/23/2017    HGB 12.7 (L) 02/23/2017    HCT 38.0 (L) 02/23/2017     (L) 02/23/2017     02/09/2017    POTASSIUM 4.4 02/09/2017    CHLORIDE 106 02/09/2017    CO2 24 02/09/2017     (H) 02/09/2017    BUN 28 02/09/2017    CR 1.23 02/09/2017    MAG 2.3 09/17/2013    INR 1.05 06/30/2014       ASSESSMENT AND PLAN:  Mr. 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   1.  Relapsed Hodgkin's lymphoma.  After allogeneic stem cell transplantation, his interim imaging after the third cycle of brentuximab chemotherapy demonstrated a positive response  to treatment with lymphadenopathy being decreased from 2.5-3 cm down to 1 cm.  We will plan on for now continuing.  He is due to start his cycle #4 of brentuximab chemotherapy.  This was delayed for 2 weeks given rhinovirus infection and bronchitis.  However, since symptoms are resolved we will proceed with the infusion today, and we will plan on repeating imaging after 3 additional cycles of chemotherapy, meaning after a total of 6 cycles of chemotherapy.  If CR is achieved, we will plan holding it and continue an observational approach.  So far, the patient has been tolerating the brentuximab quite well without any side effects, and he has had no documented neutropenia or peripheral neuropathy symptoms.   2.  Hematology.  His peripheral blood counts are normal, and as above again no neutropenia noticed with brentuximab.   3.  Infectious disease.  His URI symptoms and rhinovirus infections are resolved.  He also completed a course of Levaquin for suspected superimposed bacterial bronchitis or pneumonia with resolution of symptoms.  There is no need for any prophylactic antibiotics with brentuximab chemotherapy.    4.  Renal.  He has normal renal function, and his weight remains stable.   5.  GVHD.  The patient has had no evidence of lflup-igugfr-jtrp disease since transplantation.      RTC in 3 weeks for labs, cycle #5 of brentuximab chemotherapy and a visit.      Amy Ty MD     Hematology, Oncology and Transplantation     [facility]

## 2017-02-23 NOTE — MR AVS SNAPSHOT
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     If you are having any concerning symptoms or wish to speak to a provider before your next infusion visit, please call your care coordinator or triage to notify them so we can adequately serve you.       After Hours: [phone]    If after hours, weekends, or holidays, call main hospital  and ask for Oncology doctor on call.         February 2017 Sunday Monday Tuesday Wednesday Thursday Friday Saturday                  1     2     3     4       5     6     7     8     9     CT CHEST ABDOMEN PELVIS WWO    2:45 PM   (20 min.)   UCCT2   [facility] CT     LAB WITH  CLINIC    3:30 PM   (15 min.)    LAB   [facility]     P ONC RETURN    4:30 PM   (30 min.)   Amy Ty MD   [facility] Blood and Marrow Transplant     [facility] ONC INFUSION 60    4:30 PM   (60 min.)    ONCOLOGY INFUSION   [facility] 10     11       12     13     14     15     16     17     18       19     20     21     22     23     [facility] LAB DRAW    2:00 PM   (15 min.)   [facility] DRAW   [facility] Lab Draw     [facility] ONC RETURN    2:30 PM   (30 min.)   Amy Ty MD   [facility] Blood and Marrow Transplant     [facility] ONC INFUSION 60     3:00 PM   (60 min.)    ONCOLOGY INFUSION   [facility] 24     25       26     27     28 March 2017 Sunday Monday Tuesday Wednesday Thursday Friday Saturday                  1     2     3     4       5     6     7     8     9     10     11       12     13     14     15     16     [facility] LAB DRAW    2:15 PM   (15 min.)    [facility] LAB DRAW   [facility] Lab Draw     UMP ONC RETURN    3:00 PM   (30 min.)   Amy Ty MD   [facility] Blood and Marrow Transplant     [facility] ONC INFUSION 60    4:00 PM   (60 min.)    ONCOLOGY INFUSION   [facility] 17     18       19     20     21     22     23     24     25       26     27     28     29     30     31                      Recent Results (from the past 24 hour(s))   CBC with platelets differential    Collection Time: 02/23/17  2:00 PM   Result Value Ref Range    WBC 7.4 4.0 - 11.0 10e9/L    RBC Count 3.92 (L) 4.4 - 5.9 10e12/L    Hemoglobin 12.7 (L) 13.3 - 17.7 g/dL    Hematocrit 38.0 (L) 40.0 - 53.0 %    MCV 97 78 - 100 fl    MCH 32.4 26.5 - 33.0 pg    MCHC 33.4 31.5 - 36.5 g/dL    RDW 15.4 (H) 10.0 - 15.0 %    Platelet Count 133 (L) 150 - 450 10e9/L    Diff Method Automated Method     % Neutrophils 53.4 %    % Lymphocytes 34.4 %    % Monocytes 8.7 %    % Eosinophils 2.7 %    % Basophils 0.4 %    % Immature Granulocytes 0.4 %    Nucleated RBCs 0 0 /100    Absolute Neutrophil 4.0 1.6 - 8.3 10e9/L    Absolute Lymphocytes 2.5 0.8 - 5.3 10e9/L    Absolute Monocytes 0.6 0.0 - 1.3 10e9/L    Absolute Eosinophils 0.2 0.0 - 0.7 10e9/L    Absolute Basophils 0.0 0.0 - 0.2 10e9/L    Abs Immature Granulocytes 0.0 0 - 0.4 10e9/L    Absolute Nucleated RBC 0.0                Follow-ups after your visit        Your next 10 appointments already scheduled     Mar 16, 2017  2:15 PM [facility]   [facility] Lab Draw with  [facility] LAB DRAW   [facility] Draw ([facility])    [address] [address]   [phone]            Mar 16, 2017  3:00 PM CDT   RETURN ONC with Amy yT MD   [facility] Blood and Marrow Transplant ([facility])    [address] [place] [id number]   [phone]            Mar 16, 2017  4:00 PM CDT   Infusion 60 with UC ONCOLOGY INFUSION, UC 12 ATC   [facility] ([facility])    [address] [place] [id number]   [phone]            Apr 06, 2017  2:30 PM CDT   [facility] Lab Draw with  [facility] LAB DRAW   [facility] Lab Draw ([facility])    [address] [place] [address]   [phone]            Apr 06, 2017  3:30 PM CDT   RETURN ONC with Amy Ty MD   [facility] Blood and Marrow Transplant ([facility])    [address] [place] [address]   [phone]            Apr 06, 2017  4:00 PM CDT   Infusion 60 with UC ONCOLOGY INFUSION, UC 12 ATC   [facility] ([facility])    [address] [place] [phone]   [phone]              Future tests that were ordered for you today     Open Future Orders        Priority Expected Expires Ordered    CBC with platelets differential Routine 2/23/2017 2/28/2017 2/22/2017    Comprehensive metabolic panel Routine 2/23/2017 2/28/2017 2/22/2017    Lactate Dehydrogenase Routine 2/23/2017 2/28/2017 2/22/2017            Who to contact     If you have questions or need follow up information about today's clinic visit or your schedule please contact [facility] directly at [phone].  Normal or non-critical lab and imaging results will be communicated to you by MyChart, letter or phone within 4 business days after the clinic has received the results. If you do not hear from us  within 7 days, please contact the clinic through [email or web address] [email or web address]) or phone. If you have a critical or abnormal lab result, we will notify you by phone as soon as possible.  Submit refill requests through [email or web address] [email or web address]) or call your pharmacy and they will forward the refill request to us. Please allow 3 business days for your refill to be completed.          Additional Information About Your Visit        [facility] Information     [email or web address] ([email or web address]) gives you secure access to your electronic health record. If you see a primary care provider, you can also send messages to your care team and make appointments. If you have questions, please call your primary care clinic.  If you do not have a primary care provider, please call [phone] and they will assist you.        Care EveryWhere ID     This is your Care EveryWhere ID. This could be used by other organizations to access your [facility] medical records  [id number]         Blood Pressure from Last 3 Encounters:   02/23/17 117/78   01/19/17 133/82   12/29/16 (!) 134/91    Weight from Last 3 Encounters:   02/23/17 97.5 kg (214 lb 14.4 oz)   01/19/17 99.8 kg (220 lb)   12/29/16 100.1 kg (220 lb 9.6 oz)              We Performed the Following     CBC with platelets differential     Treatment Conditions        Primary Care Provider    Md Other Clinic                Thank you!     Thank you for choosing [facility]  for your care. Our goal is always to provide you with excellent care. Hearing back from our patients is one way we can continue to improve our services. Please take a few minutes to complete the written survey that you may receive in the mail after your visit with us. Thank you!             Your Updated Medication List - Protect others around you: Learn how to safely use, store and throw away your medicines at www.disposemymeds.org.          This list is accurate as of: 2/23/17  3:50 PM.  Always use your most recent med list.                   Brand Name Dispense  Instructions for use    ALBUTEROL SULFATE HFA IN          levofloxacin 750 MG tablet    LEVAQUIN    7 tablet    Take 1 tablet (750 mg) by mouth daily       ROBITUSSIN A-C OR

## 2017-02-23 NOTE — MR AVS SNAPSHOT
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  After hours call the   hospital at [phone] or   [phone]. Ask for the BMT   fellow for pediatric or adult patients           Care Instructions    Please reschedule the Brentuxiab infusion dates to 3/16/17 and next one 4/6/2017 with labs and provider visits those days (if available with Karson)        Follow-ups after your visit        Your next 10 appointments already scheduled     Mar 16, 2017  2:15 PM CDT   [facility] Lab Draw with UC [facility] LAB DRAW   [facility] Lab Draw ([facility])    [address] [place] [phone] [phone]            Mar 16, 2017  3:00 PM CDT   RETURN ONC with Amy Ty MD   [facility] Blood and Marrow Transplant ([facility])    [address] [place] [phone] [phone]            Mar 16, 2017  4:00 PM CDT   Infusion 60 with UC ONCOLOGY INFUSION, UC 12 ATC   [facility] ([facility])    [address] [place] [phone]            Apr 06, 2017  2:30 PM CDT   [facility] Lab Draw with UC [facility] LAB DRAW   [facility] Lab Draw ([facility])    [address] [facility]  2nd [facility] [address] [phone]            Apr 06, 2017  3:30 PM CDT   RETURN ONC with Amy Ty MD   [facility] Blood and Marrow Transplant ([facility])    [address] [place] [phone]            Apr 06, 2017  4:00 PM CDT   Infusion 60 with UC ONCOLOGY INFUSION, UC 12 ATC   [facility] ([facility])    [address] [place] [phone]              Who to contact     If you have questions or need follow up information about today's clinic visit or your schedule please contact [facility] BLOOD AND MARROW TRANSPLANT directly at [phone].  Normal or non-critical lab and imaging results will be communicated to you by MyChart, letter or phone within 4 business days after the clinic has received the results. If you do not hear from us within 7 days, please contact the clinic through [employer] or phone. If you have a critical or abnormal lab result, we will notify you by phone as soon as possible.  Submit refill requests through [employer] or call your pharmacy and they will forward the refill request to us. Please allow 3 business days for your refill to be completed.          Additional Information About Your Visit        MyChart Information     [employer] gives you secure access to your electronic health record. If you see a primary care provider, you can also send messages to your care team and make appointments. If you have questions, please call your primary care clinic.  If you do not have a primary care provider, please call [phone] and they will assist you.        Care EveryWhere ID     This is your Care EveryWhere ID. This could be used by other organizations to access your [place] medical records  [id number]        Your Vitals Were     Pulse Temperature Respirations Pulse Oximetry BMI (Body  Mass Index)       87 97.7  F (36.5  C) (Oral) 18 100% 27.58 kg/m2        Blood Pressure from Last 3 Encounters:   02/23/17 117/78   01/19/17 133/82   12/29/16 (!) 134/91    Weight from Last 3 Encounters:   02/23/17 97.5 kg (214 lb 14.4 oz)   01/19/17 99.8 kg (220 lb)   12/29/16 100.1 kg (220 lb 9.6 oz)               Recent Review Flowsheet Data     BMT Recent Results Latest Ref Rng & Units 12/1/2016 12/8/2016 12/15/2016 12/29/2016 1/19/2017 2/9/2017 2/23/2017    WBC 4.0 - 11.0 10e9/L 9.0 7.7 5.5 7.8 9.1 11.9(H) 7.4    Hemoglobin 13.3 - 17.7 g/dL 13.9 14.8 14.4 15.0 14.3 13.4 12.7(L)    Platelet Count 150 - 450 10e9/L 133(L) 132(L) 86(L) 135(L) 153 185 133(L)    Neutrophils (Absolute) 1.6 - 8.3 10e9/L 5.5 4.0 2.6 3.0 4.5 9.5(H) 4.0    Sodium 133 - 144 mmol/L 143 - 140 140 143 142 -    Potassium 3.4 - 5.3 mmol/L 3.7 - 3.4 4.0 4.2 4.4 -    Chloride 94 - 109 mmol/L 109 - 108 107 108 106 -    Glucose 70 - 99 mg/dL 126(H) - 127(H) 113(H) 93 150(H) -    Urea Nitrogen 7 - 30 mg/dL 22 - 13 24 22 28 -    Creatinine 0.66 - 1.25 mg/dL 1.31(H) - 1.04 1.20 1.05 1.23 -    Calcium (Total) 8.5 - 10.1 mg/dL 8.4(L) - 9.0 8.4(L) 8.6 8.9 -    Protein (Total) 6.8 - 8.8 g/dL 7.4 - 7.5 7.5 7.4 7.9 -    Albumin 3.4 - 5.0 g/dL 3.7 - 3.4 3.6 3.6 3.6 -    Alkaline Phosphatase 40 - 150 U/L 119 - 142 131 119 160(H) -    AST 0 - 45 U/L 33 - 106(H) 37 43 32 -    ALT 0 - 70 U/L 36 - 128(H) 68 62 49 -    MCV 78 - 100 fl 91 93 91 91 92 92 97               Primary Care Provider    Md Other Clinic                Thank you!     Thank you for choosing [facility] BLOOD AND MARROW TRANSPLANT  for your care. Our goal is always to provide you with excellent care. Hearing back from our patients is one way we can continue to improve our services. Please take a few minutes to complete the written survey that you may receive in the mail after your visit with us. Thank you!             Your Updated Medication List - Protect others around you: Learn how to safely  use, store and throw away your medicines at www.disposemymeds.org.          This list is accurate as of: 2/23/17 11:59 PM.  Always use your most recent med list.                   Brand Name Dispense Instructions for use    ALBUTEROL SULFATE HFA IN          levofloxacin 750 MG tablet    LEVAQUIN    7 tablet    Take 1 tablet (750 mg) by mouth daily       ROBITUSSIN A-PAULO OR

## 2017-02-23 NOTE — PATIENT INSTRUCTIONS
Please reschedule the Brentuxiab infusion dates to 3/16/17 and next one 4/6/2017 with labs and provider visits those days (if available with Karson)

## 2017-02-23 NOTE — LETTER
2/23/2017      RE: Brando Stoddard  [address]  [facility] [address]     Dear Colleague,    Thank you for referring your patient, Brando Stoddard, to the [facility] BLOOD AND MARROW TRANSPLANT. Please see a copy of my visit note below.    [facility] ONCOLOGY CLINIC NOTE      CHIEF COMPLAINT:  The patient reports feeling better.  His URI symptoms are resolved, and he no longer has shortness of breath.      PATIENT IDENTIFICATION:  Mr. 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  However, most recently he was noticed to have increasing axillary lymphadenopathy with lymph nodes measuring approximately 2.5-3 cm, and he was also noticed to have subcarinal lymph nodes, and we decided for the progressive disease to start him on brentuximab course of chemotherapy with the first cycle being administered on 12/05/2016, the second one on 12/29/2016, and the third cycle on 01/19/2017.  Restaging imaging after the third cycle of chemotherapy demonstrated a positive response to treatment with axillary lymph node maximal measuring 1 cm, and now he presents for cycle #4 (fourth cycle was delayed due to rhinovirus infection and bronchitis).      INTERVAL HISTORY:  Since his last visit 2 weeks ago when Mr. Stoddard was noticed to have rhinovirus URI symptoms and suspected bacterial bronchitis that was a superimposed viral infection for which he completed a course of Levaquin, he had improvement of his symptoms. In fact, his URI symptoms are resolved, and he no longer has shortness of breath.  He presents today to start cycle  #4 of brentuximab chemotherapy.  From a clinical standpoint, it does not appear like he has any contraindications to proceed.  He reports having no peripheral neuropathy symptoms either.  He has not had fevers or chills, and again no shortness of breath.  He has had no bleeding complications, and no issues with pain.  He reports having no enlarging lymphadenopathy either.      REVIEW OF SYSTEMS:  A 12-point review of systems was reviewed and was otherwise unremarkable.      PHYSICAL EXAMINATION:   VITAL SIGNS:  Today show a temperature of 97.7 Fahrenheit, respiratory rate is 18, heart rate is 87, blood pressure 117/78.  He is saturating 100% on room air.   GENERAL:  He is in no apparent distress, very pleasant.   HEAD AND NECK:  Pupils are equally round to light.  Oropharynx is clear without lesions.   CARDIAC:  Regular rate and rhythm, no murmurs, rubs or gallops.   ABDOMEN:  He has positive bowel sounds in all 4 quadrants.  Abdomen was soft, nontender and nondistended, and he had no palpable hepatosplenomegaly.   EXTREMITIES:  No pedal edema bilaterally.   LYMPH NODES:  On my physical exam today, he had no palpable cervical, axillary or inguinal lymphadenopathy bilaterally.   NEUROLOGIC:  Grossly intact.      LABORATORY DATA:  His leukocytosis is resolved, which was previously attributed to recent prednisone use, as well as possibly superimposed bacterial active infection.      Lab Results   Component Value Date    WBC 7.4 02/23/2017    ANEU 4.0 02/23/2017    HGB 12.7 (L) 02/23/2017    HCT 38.0 (L) 02/23/2017     (L) 02/23/2017     02/09/2017    POTASSIUM 4.4 02/09/2017    CHLORIDE 106 02/09/2017    CO2 24 02/09/2017     (H) 02/09/2017    BUN 28 02/09/2017    CR 1.23 02/09/2017    MAG 2.3 09/17/2013    INR 1.05 06/30/2014       ASSESSMENT AND PLAN:  Mr. 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   1.  Relapsed Hodgkin's lymphoma.  After allogeneic stem cell transplantation, his interim imaging after the third cycle of brentuximab chemotherapy demonstrated a positive response to treatment with lymphadenopathy being decreased from 2.5-3 cm down to 1 cm.  We will plan on for now continuing.  He is due to start his cycle #4 of brentuximab chemotherapy.  This was delayed for 2 weeks given rhinovirus infection and bronchitis.  However, since symptoms are resolved we will proceed with the infusion today, and we will plan on repeating imaging after 3 additional cycles of chemotherapy, meaning after a total of 6 cycles of chemotherapy.  If CR is achieved, we will plan holding it and continue an observational approach.  So far, the patient has been tolerating the brentuximab quite well without any side effects, and he has had no documented neutropenia or peripheral neuropathy symptoms.   2.  Hematology.  His peripheral blood counts are normal, and as above again no neutropenia noticed with brentuximab.   3.  Infectious disease.  His URI symptoms and rhinovirus infections are resolved.  He also completed a course of Levaquin for suspected superimposed bacterial bronchitis or pneumonia with resolution of symptoms.  There is no need for any prophylactic antibiotics with brentuximab chemotherapy.    4.  Renal.  He has normal renal function, and his weight remains stable.   5.  GVHD.  The patient has had no evidence of ambjy-igxiqm-[id number] disease since transplantation.      RTC in 3 weeks for labs, cycle #5 of brentuximab chemotherapy and a visit.      Amy Ty MD     Hematology, Oncology and Transplantation     [facility]

## 2017-02-23 NOTE — PROGRESS NOTES
Infusion Nursing Note:  Brando Stoddard presents today for Cycle 4 Day 1 brentuximab.    Patient seen by provider today: Yes: Dr. Ty    Intravenous Access:  Peripheral IV placed.    Treatment Conditions:  Lab Results   Component Value Date    HGB 12.7 02/23/2017     Lab Results   Component Value Date    WBC 7.4 02/23/2017      Lab Results   Component Value Date    ANEU 4.0 02/23/2017     Lab Results   Component Value Date     02/23/2017      Lab Results   Component Value Date     02/09/2017                   Lab Results   Component Value Date    POTASSIUM 4.4 02/09/2017           Lab Results   Component Value Date    MAG 2.3 09/17/2013            Lab Results   Component Value Date    CR 1.23 02/09/2017    CR 1.08 05/15/2013                   Lab Results   Component Value Date    MEÑO 8.9 02/09/2017                Lab Results   Component Value Date    BILITOTAL 0.5 02/09/2017           Lab Results   Component Value Date    ALBUMIN 3.6 02/09/2017                    Lab Results   Component Value Date    ALT 49 02/09/2017           Lab Results   Component Value Date    AST 32 02/09/2017     Results reviewed, labs MET treatment parameters, ok to proceed with treatment.      Post Infusion Assessment:  Patient tolerated infusion without incident.  Blood return noted pre and post infusion.  Site patent and intact, free from redness, edema or discomfort.  No evidence of extravasations.  Access discontinued per protocol.    Discharge Plan:   Patient declined prescription refills.  Copy of AVS reviewed with patient and/or family.  Patient will return 3/16/17 for next appointment.  Patient discharged in stable condition accompanied by: self.  Departure Mode: Ambulatory.    Chyna Shabazz RN

## 2017-02-23 NOTE — NURSING NOTE
Chief Complaint   Patient presents with     Blood Draw     labs drawn from right arm and vital taken by VALDEZ Carpenter CMA February 23, 2017

## 2017-02-23 NOTE — PATIENT INSTRUCTIONS
Contact Numbers    [facility] [place] Main Line: [phone]  [facility] [place] Triage:  [phone]    Call triage with chills and/or temperature greater than or equal to 100.5, uncontrolled nausea/vomiting, diarrhea, constipation, dizziness, shortness of breath, chest pain, bleeding, unexplained bruising, or any new/concerning symptoms, questions/concerns.     If you are having any concerning symptoms or wish to speak to a provider before your next infusion visit, please call your care coordinator or triage to notify them so we can adequately serve you.       After Hours: [phone]    If after hours, weekends, or holidays, call main hospital  and ask for Oncology doctor on call.         February 2017 Sunday Monday Tuesday Wednesday Thursday Friday Saturday                  1     2     3     4       5     6     7     8     9     CT CHEST ABDOMEN PELVIS WWO    2:45 PM   (20 min.)   CT2   [facility] CT     LAB WITH  CLINIC    3:30 PM   (15 min.)    LAB   [facility]     [facility] ONC RETURN    4:30 PM   (30 min.)   Amy Ty MD   [facility]     P ONC INFUSION 60    4:30 PM   (60 min.)    ONCOLOGY INFUSION   [facility] 10     11       12     13     14     15     16     17     18       19     20     21     22     23     [facility] DRAW    2:00 PM   (15 min.)   [facility]   [facility] Lab Draw     [facility] ONC RETURN    2:30 PM   (30 min.)   Amy Ty MD   [facility]     P ONC INFUSION 60    3:00 PM   (60 min.)    ONCOLOGY INFUSION   [facility] 24     25       26     27     28 March 2017 Sunday Monday Tuesday Wednesday Thursday Friday Saturday                  1     2     3     4       5     6     7     8     9     10     11       12     13     14     15     16     [facility] D[facility]    2:15 PM   (15 min.)    [facility] DRAW   [facility]  Draw     [facility] ONC RETURN    3:00 PM   (30 min.)   mAy Ty MD   [facility] Blood and Marrow Transplant     [facility] ONC INFUSION 60    4:00 PM   (60 min.)    ONCOLOGY INFUSION   [facility] 17     18       19     20     21     22     23     24     25       26     27     28     29     30     31                      Recent Results (from the past 24 hour(s))   CBC with platelets differential    Collection Time: 02/23/17  2:00 PM   Result Value Ref Range    WBC 7.4 4.0 - 11.0 10e9/L    RBC Count 3.92 (L) 4.4 - 5.9 10e12/L    Hemoglobin 12.7 (L) 13.3 - 17.7 g/dL    Hematocrit 38.0 (L) 40.0 - 53.0 %    MCV 97 78 - 100 fl    MCH 32.4 26.5 - 33.0 pg    MCHC 33.4 31.5 - 36.5 g/dL    RDW 15.4 (H) 10.0 - 15.0 %    Platelet Count 133 (L) 150 - 450 10e9/L    Diff Method Automated Method     % Neutrophils 53.4 %    % Lymphocytes 34.4 %    % Monocytes 8.7 %    % Eosinophils 2.7 %    % Basophils 0.4 %    % Immature Granulocytes 0.4 %    Nucleated RBCs 0 0 /100    Absolute Neutrophil 4.0 1.6 - 8.3 10e9/L    Absolute Lymphocytes 2.5 0.8 - 5.3 10e9/L    Absolute Monocytes 0.6 0.0 - 1.3 10e9/L    Absolute Eosinophils 0.2 0.0 - 0.7 10e9/L    Absolute Basophils 0.0 0.0 - 0.2 10e9/L    Abs Immature Granulocytes 0.0 0 - 0.4 10e9/L    Absolute Nucleated RBC 0.0

## 2017-03-15 RX ORDER — DIPHENHYDRAMINE HCL 25 MG
50 CAPSULE ORAL
Status: CANCELLED
Start: 2017-03-30

## 2017-03-15 RX ORDER — METHYLPREDNISOLONE SODIUM SUCCINATE 125 MG/2ML
125 INJECTION, POWDER, LYOPHILIZED, FOR SOLUTION INTRAMUSCULAR; INTRAVENOUS
Status: CANCELLED
Start: 2017-03-30

## 2017-03-15 RX ORDER — ALBUTEROL SULFATE 90 UG/1
1-2 AEROSOL, METERED RESPIRATORY (INHALATION)
Status: CANCELLED
Start: 2017-03-30

## 2017-03-15 RX ORDER — LORAZEPAM 2 MG/ML
0.5 INJECTION INTRAMUSCULAR EVERY 4 HOURS PRN
Status: CANCELLED
Start: 2017-03-30

## 2017-03-15 RX ORDER — DIPHENHYDRAMINE HYDROCHLORIDE 50 MG/ML
50 INJECTION INTRAMUSCULAR; INTRAVENOUS
Status: CANCELLED
Start: 2017-03-30

## 2017-03-15 RX ORDER — ALBUTEROL SULFATE 0.83 MG/ML
2.5 SOLUTION RESPIRATORY (INHALATION)
Status: CANCELLED | OUTPATIENT
Start: 2017-03-30

## 2017-03-15 RX ORDER — MEPERIDINE HYDROCHLORIDE 25 MG/ML
25 INJECTION INTRAMUSCULAR; INTRAVENOUS; SUBCUTANEOUS EVERY 30 MIN PRN
Status: CANCELLED | OUTPATIENT
Start: 2017-03-30

## 2017-03-15 RX ORDER — ACETAMINOPHEN 325 MG/1
650 TABLET ORAL
Status: CANCELLED
Start: 2017-03-30

## 2017-03-15 RX ORDER — SODIUM CHLORIDE 9 MG/ML
1000 INJECTION, SOLUTION INTRAVENOUS CONTINUOUS PRN
Status: CANCELLED
Start: 2017-03-30

## 2017-03-15 RX ORDER — EPINEPHRINE 0.3 MG/.3ML
0.3 INJECTION SUBCUTANEOUS EVERY 5 MIN PRN
Status: CANCELLED | OUTPATIENT
Start: 2017-03-30

## 2017-03-16 ENCOUNTER — APPOINTMENT (OUTPATIENT)
Dept: LAB | Facility: CLINIC | Age: 57
End: 2017-03-16
Attending: INTERNAL MEDICINE
Payer: COMMERCIAL

## 2017-03-16 ENCOUNTER — OFFICE VISIT (OUTPATIENT)
Dept: TRANSPLANT | Facility: CLINIC | Age: 57
End: 2017-03-16
Attending: INTERNAL MEDICINE
Payer: COMMERCIAL

## 2017-03-16 VITALS
RESPIRATION RATE: 18 BRPM | HEART RATE: 111 BPM | OXYGEN SATURATION: 94 % | WEIGHT: 211.3 LBS | DIASTOLIC BLOOD PRESSURE: 77 MMHG | BODY MASS INDEX: 27.12 KG/M2 | SYSTOLIC BLOOD PRESSURE: 121 MMHG | TEMPERATURE: 98.3 F

## 2017-03-16 DIAGNOSIS — J10.1 INFLUENZA A: ICD-10-CM

## 2017-03-16 DIAGNOSIS — C81.04 NODULAR LYMPHOCYTE PREDOMINANT HODGKIN LYMPHOMA OF LYMPH NODES OF AXILLA (H): ICD-10-CM

## 2017-03-16 DIAGNOSIS — J06.0 ACUTE LARYNGOPHARYNGITIS: Primary | ICD-10-CM

## 2017-03-16 LAB
ALBUMIN SERPL-MCNC: 3.6 G/DL (ref 3.4–5)
ALP SERPL-CCNC: 149 U/L (ref 40–150)
ALT SERPL W P-5'-P-CCNC: 56 U/L (ref 0–70)
ANION GAP SERPL CALCULATED.3IONS-SCNC: 10 MMOL/L (ref 3–14)
AST SERPL W P-5'-P-CCNC: 60 U/L (ref 0–45)
BASOPHILS # BLD AUTO: 0 10E9/L (ref 0–0.2)
BASOPHILS NFR BLD AUTO: 0.4 %
BILIRUB SERPL-MCNC: 0.6 MG/DL (ref 0.2–1.3)
BUN SERPL-MCNC: 21 MG/DL (ref 7–30)
CALCIUM SERPL-MCNC: 8.2 MG/DL (ref 8.5–10.1)
CHLORIDE SERPL-SCNC: 106 MMOL/L (ref 94–109)
CO2 SERPL-SCNC: 26 MMOL/L (ref 20–32)
CREAT SERPL-MCNC: 1.16 MG/DL (ref 0.66–1.25)
DIFFERENTIAL METHOD BLD: ABNORMAL
EOSINOPHIL # BLD AUTO: 0 10E9/L (ref 0–0.7)
EOSINOPHIL NFR BLD AUTO: 0.6 %
ERYTHROCYTE [DISTWIDTH] IN BLOOD BY AUTOMATED COUNT: 14.3 % (ref 10–15)
GFR SERPL CREATININE-BSD FRML MDRD: 65 ML/MIN/1.7M2
GLUCOSE SERPL-MCNC: 97 MG/DL (ref 70–99)
HCT VFR BLD AUTO: 41.4 % (ref 40–53)
HGB BLD-MCNC: 14 G/DL (ref 13.3–17.7)
IMM GRANULOCYTES # BLD: 0 10E9/L (ref 0–0.4)
IMM GRANULOCYTES NFR BLD: 0.2 %
LDH SERPL L TO P-CCNC: 316 U/L (ref 85–227)
LYMPHOCYTES # BLD AUTO: 2.5 10E9/L (ref 0.8–5.3)
LYMPHOCYTES NFR BLD AUTO: 53.4 %
MCH RBC QN AUTO: 32.3 PG (ref 26.5–33)
MCHC RBC AUTO-ENTMCNC: 33.8 G/DL (ref 31.5–36.5)
MCV RBC AUTO: 95 FL (ref 78–100)
MONOCYTES # BLD AUTO: 0.8 10E9/L (ref 0–1.3)
MONOCYTES NFR BLD AUTO: 17.3 %
NEUTROPHILS # BLD AUTO: 1.3 10E9/L (ref 1.6–8.3)
NEUTROPHILS NFR BLD AUTO: 28.1 %
NRBC # BLD AUTO: 0 10*3/UL
NRBC BLD AUTO-RTO: 0 /100
PLATELET # BLD AUTO: 84 10E9/L (ref 150–450)
POTASSIUM SERPL-SCNC: 4 MMOL/L (ref 3.4–5.3)
PROT SERPL-MCNC: 7.4 G/DL (ref 6.8–8.8)
RBC # BLD AUTO: 4.34 10E12/L (ref 4.4–5.9)
RBC MORPH BLD: NORMAL
SODIUM SERPL-SCNC: 143 MMOL/L (ref 133–144)
WBC # BLD AUTO: 4.7 10E9/L (ref 4–11)

## 2017-03-16 PROCEDURE — 87633 RESP VIRUS 12-25 TARGETS: CPT | Performed by: INTERNAL MEDICINE

## 2017-03-16 PROCEDURE — 36415 COLL VENOUS BLD VENIPUNCTURE: CPT

## 2017-03-16 PROCEDURE — 80053 COMPREHEN METABOLIC PANEL: CPT | Performed by: INTERNAL MEDICINE

## 2017-03-16 PROCEDURE — 99211 OFF/OP EST MAY X REQ PHY/QHP: CPT | Mod: ZF

## 2017-03-16 PROCEDURE — 83615 LACTATE (LD) (LDH) ENZYME: CPT | Performed by: INTERNAL MEDICINE

## 2017-03-16 PROCEDURE — 85025 COMPLETE CBC W/AUTO DIFF WBC: CPT | Performed by: INTERNAL MEDICINE

## 2017-03-16 NOTE — NURSING NOTE
Nasal swab was done per order.  Patient tolerated procedure with no incident.   Susan Carpenter CMA March 16, 2017

## 2017-03-16 NOTE — LETTER
3/16/2017      RE: Brando Stoddard  [address]  [facility] [address]     Dear Colleague,    Thank you for referring your patient, Brando Stoddard, to the [facility] BLOOD AND MARROW TRANSPLANT. Please see a copy of my visit note below.    [facility] ONCOLOGY CLINIC NOTE      CHIEF COMPLAINT: New URI symptoms started yesterday, no fevers or chills. No cough or SOB.      PATIENT IDENTIFICATION:  Mr. 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  However, most recently he was noticed to have increasing axillary lymphadenopathy with lymph nodes measuring approximately 2.5-3 cm, and he was also noticed to have subcarinal lymph nodes, and we decided for the progressive disease to start him on brentuximab course of chemotherapy with the first cycle being administered on 12/05/2016, the second one on 12/29/2016, and the third cycle on 01/19/2017.  Restaging imaging after the third cycle of chemotherapy demonstrated a positive response to treatment with axillary lymph node maximal measuring 1 cm, and now he presents for cycle #4 (fourth cycle was delayed due to rhinovirus infection and bronchitis). Cycle #5 was also delayed due to URI symptoms.      INTERVAL HISTORY:  HISTORY OF PRESENT ILLNESS:  Mr. Stoddard presented for cycle number 5 of brentuximab today.  However, unfortunately he has new URI symptoms.  Previously a month ago his cycle number 4 of brentuximab was delayed due to rhinovirus infection and suspected bronchitis.  These symptoms had resolved; however, starting from yesterday he has new sinus congestion  and rhinorrhea.  He stated that his wife has been sick last week with similar symptoms  Related to work, his wife is traveling frequently and it looks like she picked up the infection on a plane.  He reports having no fevers, no chills, no shortness of breath or productive cough, and other than sinus congestion and rhinorrhea symptoms he has no other  new signs or symptoms of infection.  He reports having no peripheral neuropathy symptoms, either.  He was noticed to have a platelet count of 84,000.  He has no bleeding issues and no excessive bruisability.  He is unaware of any enlarging lymphadenopathy, either.  He reports having no increased night sweats.       REVIEW OF SYSTEMS:  A 12-point review of systems was reviewed and was otherwise unremarkable.         PHYSICAL EXAMINATION:   VITAL SIGNS: /77  Pulse 111  Temp 98.3  F (36.8  C) (Oral)  Resp 18  Wt 95.8 kg (211 lb 4.8 oz)  SpO2 94%  BMI 27.12 kg/m2  GENERAL:  He is in no apparent distress, very pleasant.   HEAD AND NECK:  Pupils are equally round to light.  Oropharynx is clear without lesions.   CARDIAC:  Regular rate and rhythm, no murmurs, rubs or gallops.   ABDOMEN:  Positive bowel sounds.  Abdomen soft, nontender/ nondistended, no palpable hepatosplenomegaly.   EXTREMITIES:  No pedal edema bilaterally.   LYMPH NODES:  On physical exam today, he had no palpable cervical, axillary or inguinal lymphadenopathy bilaterally.   NEUROLOGIC:  Grossly intact.      LABORATORY DATA:     Lab Results   Component Value Date    WBC 4.7 03/16/2017    ANEU 4.0 02/23/2017    HGB 14.0 03/16/2017    HCT 41.4 03/16/2017    PLT 84 (L) 03/16/2017     02/09/2017    POTASSIUM 4.4 02/09/2017    CHLORIDE 106 02/09/2017    CO2 24 02/09/2017     (H) 02/09/2017    BUN 28 02/09/2017    CR 1.23 02/09/2017    MAG 2.3 09/17/2013    INR 1.05 06/30/2014       ASSESSMENT AND PLAN:  Mr. Stoddard is a 56-year-old gentleman with relapsed Hodgkin lymphoma, status post  non-myeloablative umbilical cord blood transplant who is receiving brentuximab chemotherapy for relapsed disease with good response to treatment.      1.  Relapsed Hodgkin lymphoma after allogeneic transplantation.  Prior to starting brentuximab chemotherapy, his axillary lymphadenopathy was measuring approximately 2.5 to 3 cm, and on today's examination he is found to have no palpable axillary lymph nodes.  He presents for cycle number 5 of brentuximab chemotherapy; however, due to URI symptoms, we decided to delay this cycle for 2 weeks.        His interim imaging demonstrated positive response to treatment with CT scan showing reduction of the size of axillary lymphadenopathy from 3 cm to 1 cm.  The patient tolerated chemotherapy well without any side effects.  He has no peripheral neuropathy symptoms and we have not noticed him to have any neutropenia with chemotherapy, either.       2.  Hematology.  His peripheral blood counts are normal except platelets being 84,000 which partially can be due to chemotherapy and partially due to possible current viral infection.       3.  Infectious issues.  He still presents with new URI symptoms.  We will do respiratory viral PCRs and see if there is any infection such as influenza or RSV that we can treat with antibiotic therapy.  Otherwise, we will continue supportive care for the next 2 weeks before starting cycle number 5 of brentuximab for him.       Of note, he had URI symptoms a month ago consistent with rhinovirus infection as well as suspected bacterial superimposed infection for which he received a course of Levaquin for possible bronchitis or pneumonia with symptom resolution.       4.  Renal.  His kidney function remains normal.       5.  Igcft-whyecl-prxe disease.  He has had no signs or symptoms of ieznn-yeahqn-oops disease since his transplantation.       The plan is to come in 2 weeks for labs, cycle number 5 of brentuximab, and visit with me in clinic or  earlier on an as-needed basis.         Again, thank you for allowing me to participate in the care of your patient.      Sincerely,    Amy Ty MD

## 2017-03-16 NOTE — LETTER
3/16/2017      RE: Brando Stoddard  [address]  [place] [address]     Dear Colleague,    Thank you for referring your patient, Brando Stoddard, to the [facility] BLOOD AND MARROW TRANSPLANT. Please see a copy of my visit note below.    [facility] ONCOLOGY CLINIC NOTE      CHIEF COMPLAINT: New URI symptoms started yesterday, no fevers or chills. No cough or SOB.      PATIENT IDENTIFICATION:  Mr. 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  However, most recently he was noticed to have increasing axillary lymphadenopathy with lymph nodes measuring approximately 2.5-3 cm, and he was also noticed to have subcarinal lymph nodes, and we decided for the progressive disease to start him on brentuximab course of chemotherapy with the first cycle being administered on 12/05/2016, the second one on 12/29/2016, and the third cycle on 01/19/2017.  Restaging imaging after the third cycle of chemotherapy demonstrated a positive response to treatment with axillary lymph node maximal measuring 1 cm, and now he presents for cycle #4 (fourth cycle was delayed due to rhinovirus infection and bronchitis). Cycle #5 was also delayed due to URI symptoms.      INTERVAL HISTORY:  HISTORY OF PRESENT ILLNESS:  Mr. Stoddard presented for cycle number 5 of brentuximab today.  However, unfortunately he has new URI symptoms.  Previously a month ago his cycle number 4 of brentuximab was delayed due to rhinovirus infection and suspected bronchitis.  These symptoms had resolved; however, starting from yesterday he has new sinus congestion  and rhinorrhea.  He stated that his wife has been sick last week with similar symptoms  Related to work, his wife is traveling frequently and it looks like she picked up the infection on a plane.  He reports having no fevers, no chills, no shortness of breath or productive cough, and other than sinus congestion and rhinorrhea symptoms he has no other  new signs or symptoms of infection.  He reports having no peripheral neuropathy symptoms, either.  He was noticed to have a platelet count of 84,000.  He has no bleeding issues and no excessive bruisability.  He is unaware of any enlarging lymphadenopathy, either.  He reports having no increased night sweats.       REVIEW OF SYSTEMS:  A 12-point review of systems was reviewed and was otherwise unremarkable.         PHYSICAL EXAMINATION:   VITAL SIGNS: /77  Pulse 111  Temp 98.3  F (36.8  C) (Oral)  Resp 18  Wt 95.8 kg (211 lb 4.8 oz)  SpO2 94%  BMI 27.12 kg/m2  GENERAL:  He is in no apparent distress, very pleasant.   HEAD AND NECK:  Pupils are equally round to light.  Oropharynx is clear without lesions.   CARDIAC:  Regular rate and rhythm, no murmurs, rubs or gallops.   ABDOMEN:  Positive bowel sounds.  Abdomen soft, nontender/ nondistended, no palpable hepatosplenomegaly.   EXTREMITIES:  No pedal edema bilaterally.   LYMPH NODES:  On physical exam today, he had no palpable cervical, axillary or inguinal lymphadenopathy bilaterally.   NEUROLOGIC:  Grossly intact.      LABORATORY DATA:     Lab Results   Component Value Date    WBC 4.7 03/16/2017    ANEU 4.0 02/23/2017    HGB 14.0 03/16/2017    HCT 41.4 03/16/2017    PLT 84 (L) 03/16/2017     02/09/2017    POTASSIUM 4.4 02/09/2017    CHLORIDE 106 02/09/2017    CO2 24 02/09/2017     (H) 02/09/2017    BUN 28 02/09/2017    CR 1.23 02/09/2017    MAG 2.3 09/17/2013    INR 1.05 06/30/2014       ASSESSMENT AND PLAN:  Mr. Stoddard is a 56-year-old gentleman with relapsed Hodgkin lymphoma, status post  non-myeloablative umbilical cord blood transplant who is receiving brentuximab chemotherapy for relapsed disease with good response to treatment.      1.  Relapsed Hodgkin lymphoma after allogeneic transplantation.  Prior to starting brentuximab chemotherapy, his axillary lymphadenopathy was measuring approximately 2.5 to 3 cm, and on today's examination he is found to have no palpable axillary lymph nodes.  He presents for cycle number 5 of brentuximab chemotherapy; however, due to URI symptoms, we decided to delay this cycle for 2 weeks.        His interim imaging demonstrated positive response to treatment with CT scan showing reduction of the size of axillary lymphadenopathy from 3 cm to 1 cm.  The patient tolerated chemotherapy well without any side effects.  He has no peripheral neuropathy symptoms and we have not noticed him to have any neutropenia with chemotherapy, either.       2.  Hematology.  His peripheral blood counts are normal except platelets being 84,000 which partially can be due to chemotherapy and partially due to possible current viral infection.       3.  Infectious issues.  He still presents with new URI symptoms.  We will do respiratory viral PCRs and see if there is any infection such as influenza or RSV that we can treat with antibiotic therapy.  Otherwise, we will continue supportive care for the next 2 weeks before starting cycle number 5 of brentuximab for him.       Of note, he had URI symptoms a month ago consistent with rhinovirus infection as well as suspected bacterial superimposed infection for which he received a course of Levaquin for possible bronchitis or pneumonia with symptom resolution.       4.  Renal.  His kidney function remains normal.       5.  Attzc-scdjar-qptd disease.  He has had no signs or symptoms of xwzsg-uyyuld-uzps disease since his transplantation.       The plan is to come in 2 weeks for labs, cycle number 5 of brentuximab, and visit with me in clinic or  earlier on an as-needed basis.         Again, thank you for allowing me to participate in the care of your patient.      Sincerely,    Amy Ty MD

## 2017-03-16 NOTE — MR AVS SNAPSHOT
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  After hours call the   hospital at [phone] or   [phone]. Ask for the BMT   fellow for pediatric or adult patients           Care Instructions    RTC with labs, Brentuximab chemotherapy and visit with Karson in 2 weeks         Follow-ups after your visit        Your next 10 appointments already scheduled     Mar 30, 2017  3:00 PM CDT   [facility] Draw with [facility] LAB DRAW   [facility] Lab Draw ([facility])    [address] [place] [id number] [phone]            Mar 30, 2017  3:30 PM CDT   RETURN ONC with Amy Ty MD   [facility] Blood and Marrow Transplant ([facility])    [address] [place] [address] [phone]            Mar 30, 2017  4:00 PM CDT   Infusion 60 with  ONCOLOGY INFUSION, UC 12 ATC   [facility] ([facility])    [address] [place] [address] [phone]              Who to contact     If you have questions or need follow up information about today's clinic visit or your schedule please contact [facility] BLOOD AND MARROW TRANSPLANT directly at [phone].  Normal or non-critical lab and imaging  results will be communicated to you by vzaarhart, letter or phone within 4 business days after the clinic has received the results. If you do not hear from us within 7 days, please contact the clinic through [employer] or phone. If you have a critical or abnormal lab result, we will notify you by phone as soon as possible.  Submit refill requests through [employer] or call your pharmacy and they will forward the refill request to us. Please allow 3 business days for your refill to be completed.          Additional Information About Your Visit        [employer] Information     [employer] gives you secure access to your electronic health record. If you see a primary care provider, you can also send messages to your care team and make appointments. If you have questions, please call your primary care clinic.  If you do not have a primary care provider, please call [phone] and they will assist you.        Care EveryWhere ID     This is your Care EveryWhere ID. This could be used by other organizations to access your [facility] medical records  [id number]        Your Vitals Were     Pulse Temperature Respirations Pulse Oximetry BMI (Body Mass Index)       111 98.3  F (36.8  C) (Oral) 18 94% 27.12 kg/m2        Blood Pressure from Last 3 Encounters:   03/16/17 121/77   02/23/17 117/78   01/19/17 133/82    Weight from Last 3 Encounters:   03/16/17 95.8 kg (211 lb 4.8 oz)   02/23/17 97.5 kg (214 lb 14.4 oz)   01/19/17 99.8 kg (220 lb)              We Performed the Following     CBC with platelets differential     Comprehensive metabolic panel     Lactate Dehydrogenase     Respiratory Virus Panel by PCR          Today's Medication Changes          These changes are accurate as of: 3/16/17  3:38 PM.  If you have any questions, ask your nurse or doctor.               Stop taking these medicines if you haven't already. Please contact your care team if you have questions.     ALBUTEROL SULFATE HFA IN   Stopped by:  Amy Ty MD            levofloxacin 750 MG tablet   Commonly known as:  LEVAQUIN   Stopped by:  Amy Ty MD           ROBITUSSIN A-C OR   Stopped by:  Amy Ty MD                    Recent Review Flowsheet Data     BMT Recent Results Latest Ref Rng & Units 12/8/2016 12/15/2016 12/29/2016 1/19/2017 2/9/2017 2/23/2017 3/16/2017    WBC 4.0 - 11.0 10e9/L 7.7 5.5 7.8 9.1 11.9(H) 7.4 4.7    Hemoglobin 13.3 - 17.7 g/dL 14.8 14.4 15.0 14.3 13.4 12.7(L) 14.0    Platelet Count 150 - 450 10e9/L 132(L) 86(L) 135(L) 153 185 133(L) 84(L)    Neutrophils (Absolute) 1.6 - 8.3 10e9/L 4.0 2.6 3.0 4.5 9.5(H) 4.0 -    Sodium 133 - 144 mmol/L - 140 140 143 142 - -    Potassium 3.4 - 5.3 mmol/L - 3.4 4.0 4.2 4.4 - -    Chloride 94 - 109 mmol/L - 108 107 108 106 - -    Glucose 70 - 99 mg/dL - 127(H) 113(H) 93 150(H) - -    Urea Nitrogen 7 - 30 mg/dL - 13 24 22 28 - -    Creatinine 0.66 - 1.25 mg/dL - 1.04 1.20 1.05 1.23 - -    Calcium (Total) 8.5 - 10.1 mg/dL - 9.0 8.4(L) 8.6 8.9 - -    Protein (Total) 6.8 - 8.8 g/dL - 7.5 7.5 7.4 7.9 - -    Albumin 3.4 - 5.0 g/dL - 3.4 3.6 3.6 3.6 - -    Alkaline Phosphatase 40 - 150 U/L - 142 131 119 160(H) - -    AST 0 - 45 U/L - 106(H) 37 43 32 - -    ALT 0 - 70 U/L - 128(H) 68 62 49 - -    MCV 78 - 100 fl 93 91 91 92 92 97 95               Primary Care Provider    Md Other Clinic                Thank you!     Thank you for choosing [facility] BLOOD AND MARROW TRANSPLANT  for your care. Our goal is always to provide you with excellent care. Hearing back from our patients is one way we can continue to improve our services. Please take a few minutes to complete the written survey that you may receive in the mail after your visit with us. Thank you!             Your Updated Medication List - Protect others around you: Learn how to safely use, store and throw away your medicines at www.disposemymeds.org.      Notice  As of 3/16/2017  3:38 PM    You have not been prescribed any medications.

## 2017-03-16 NOTE — NURSING NOTE
Chief Complaint   Patient presents with     Blood Draw     Pt with hx of lymphoma labs collected via venipuncture. Pt elected to have iv placed during infusion appt.

## 2017-03-16 NOTE — LETTER
3/16/2017      RE: Brando Stoddard  [address]  [facility] [address]     Dear Colleague,    Thank you for referring your patient, Brando Stoddard, to the [facility] BLOOD AND MARROW TRANSPLANT. Please see a copy of my visit note below.    [facility] ONCOLOGY CLINIC NOTE      CHIEF COMPLAINT: New URI symptoms started yesterday, no fevers or chills. No cough or SOB.      PATIENT IDENTIFICATION:  Mr. 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  However, most recently he was noticed to have increasing axillary lymphadenopathy with lymph nodes measuring approximately 2.5-3 cm, and he was also noticed to have subcarinal lymph nodes, and we decided for the progressive disease to start him on brentuximab course of chemotherapy with the first cycle being administered on 12/05/2016, the second one on 12/29/2016, and the third cycle on 01/19/2017.  Restaging imaging after the third cycle of chemotherapy demonstrated a positive response to treatment with axillary lymph node maximal measuring 1 cm, and now he presents for cycle #4 (fourth cycle was delayed due to rhinovirus infection and bronchitis). Cycle #5 was also delayed due to URI symptoms.      INTERVAL HISTORY:  HISTORY OF PRESENT ILLNESS:  Mr. Stoddard presented for cycle number 5 of brentuximab today.  However, unfortunately he has new URI symptoms.  Previously a month ago his cycle number 4 of brentuximab was delayed due to rhinovirus infection and suspected bronchitis.  These symptoms had resolved; however, starting from yesterday he has new sinus congestion  and rhinorrhea.  He stated that his wife has been sick last week with similar symptoms  Related to work, his wife is traveling frequently and it looks like she picked up the infection on a plane.  He reports having no fevers, no chills, no shortness of breath or productive cough, and other than sinus congestion and rhinorrhea symptoms he has no other  new signs or symptoms of infection.  He reports having no peripheral neuropathy symptoms, either.  He was noticed to have a platelet count of 84,000.  He has no bleeding issues and no excessive bruisability.  He is unaware of any enlarging lymphadenopathy, either.  He reports having no increased night sweats.       REVIEW OF SYSTEMS:  A 12-point review of systems was reviewed and was otherwise unremarkable.         PHYSICAL EXAMINATION:   VITAL SIGNS: /77  Pulse 111  Temp 98.3  F (36.8  C) (Oral)  Resp 18  Wt 95.8 kg (211 lb 4.8 oz)  SpO2 94%  BMI 27.12 kg/m2  GENERAL:  He is in no apparent distress, very pleasant.   HEAD AND NECK:  Pupils are equally round to light.  Oropharynx is clear without lesions.   CARDIAC:  Regular rate and rhythm, no murmurs, rubs or gallops.   ABDOMEN:  Positive bowel sounds.  Abdomen soft, nontender/ nondistended, no palpable hepatosplenomegaly.   EXTREMITIES:  No pedal edema bilaterally.   LYMPH NODES:  On physical exam today, he had no palpable cervical, axillary or inguinal lymphadenopathy bilaterally.   NEUROLOGIC:  Grossly intact.      LABORATORY DATA:     Lab Results   Component Value Date    WBC 4.7 03/16/2017    ANEU 4.0 02/23/2017    HGB 14.0 03/16/2017    HCT 41.4 03/16/2017    PLT 84 (L) 03/16/2017     02/09/2017    POTASSIUM 4.4 02/09/2017    CHLORIDE 106 02/09/2017    CO2 24 02/09/2017     (H) 02/09/2017    BUN 28 02/09/2017    CR 1.23 02/09/2017    MAG 2.3 09/17/2013    INR 1.05 06/30/2014       ASSESSMENT AND PLAN:  Mr. Stoddard is a 56-year-old gentleman with relapsed Hodgkin lymphoma, status post  non-myeloablative umbilical cord blood transplant who is receiving brentuximab chemotherapy for relapsed disease with good response to treatment.      1.  Relapsed Hodgkin lymphoma after allogeneic transplantation.  Prior to starting brentuximab chemotherapy, his axillary lymphadenopathy was measuring approximately 2.5 to 3 cm, and on today's examination he is found to have no palpable axillary lymph nodes.  He presents for cycle number 5 of brentuximab chemotherapy; however, due to URI symptoms, we decided to delay this cycle for 2 weeks.        His interim imaging demonstrated positive response to treatment with CT scan showing reduction of the size of axillary lymphadenopathy from 3 cm to 1 cm.  The patient tolerated chemotherapy well without any side effects.  He has no peripheral neuropathy symptoms and we have not noticed him to have any neutropenia with chemotherapy, either.       2.  Hematology.  His peripheral blood counts are normal except platelets being 84,000 which partially can be due to chemotherapy and partially due to possible current viral infection.       3.  Infectious issues.  He still presents with new URI symptoms.  We will do respiratory viral PCRs and see if there is any infection such as influenza or RSV that we can treat with antibiotic therapy.  Otherwise, we will continue supportive care for the next 2 weeks before starting cycle number 5 of brentuximab for him.       Of note, he had URI symptoms a month ago consistent with rhinovirus infection as well as suspected bacterial superimposed infection for which he received a course of Levaquin for possible bronchitis or pneumonia with symptom resolution.       4.  Renal.  His kidney function remains normal.       5.  Ilxpw-fpuxrm-hesu disease.  He has had no signs or symptoms of cmnsa-kdissn-cjzu disease since his transplantation.       The plan is to come in 2 weeks for labs, cycle number 5 of brentuximab, and visit with me in clinic or  earlier on an as-needed basis.         Again, thank you for allowing me to participate in the care of your patient.      Sincerely,    Amy Ty MD

## 2017-03-16 NOTE — PROGRESS NOTES
[facility] ONCOLOGY CLINIC NOTE      CHIEF COMPLAINT: New URI symptoms started yesterday, no fevers or chills. No cough or SOB.      PATIENT IDENTIFICATION:  Mr. 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  However, most recently he was noticed to have increasing axillary lymphadenopathy with lymph nodes measuring approximately 2.5-3 cm, and he was also noticed to have subcarinal lymph nodes, and we decided for the progressive disease to start him on brentuximab course of chemotherapy with the first cycle being administered on 12/05/2016, the second one on 12/29/2016, and the third cycle on 01/19/2017.  Restaging imaging after the third cycle of chemotherapy demonstrated a positive response to treatment with axillary lymph node maximal measuring 1 cm, and now he presents for cycle #4 (fourth cycle was delayed due to rhinovirus infection and bronchitis). Cycle #5 was also delayed due to URI symptoms.      INTERVAL HISTORY:  HISTORY OF PRESENT ILLNESS:  Mr. Stoddard presented for cycle number 5 of brentuximab today.  However, unfortunately he has new URI symptoms.  Previously a month ago his cycle number 4 of brentuximab was delayed due to rhinovirus infection and suspected bronchitis.  These symptoms had resolved; however, starting from yesterday he has new sinus congestion and rhinorrhea.  He stated that his wife has been sick last week with similar symptoms  Related to work, his wife is traveling frequently and it looks like she picked up the infection on a plane.  He reports having no fevers, no chills, no shortness of  breath or productive cough, and other than sinus congestion and rhinorrhea symptoms he has no other  new signs or symptoms of infection.  He reports having no peripheral neuropathy symptoms, either.  He was noticed to have a platelet count of 84,000.  He has no bleeding issues and no excessive bruisability.  He is unaware of any enlarging lymphadenopathy, either.  He reports having no increased night sweats.       REVIEW OF SYSTEMS:  A 12-point review of systems was reviewed and was otherwise unremarkable.         PHYSICAL EXAMINATION:   VITAL SIGNS: /77  Pulse 111  Temp 98.3  F (36.8  C) (Oral)  Resp 18  Wt 95.8 kg (211 lb 4.8 oz)  SpO2 94%  BMI 27.12 kg/m2  GENERAL:  He is in no apparent distress, very pleasant.   HEAD AND NECK:  Pupils are equally round to light.  Oropharynx is clear without lesions.   CARDIAC:  Regular rate and rhythm, no murmurs, rubs or gallops.   ABDOMEN:  Positive bowel sounds.  Abdomen soft, nontender/ nondistended, no palpable hepatosplenomegaly.   EXTREMITIES:  No pedal edema bilaterally.   LYMPH NODES:  On physical exam today, he had no palpable cervical, axillary or inguinal lymphadenopathy bilaterally.   NEUROLOGIC:  Grossly intact.      LABORATORY DATA:     Lab Results   Component Value Date    WBC 4.7 03/16/2017    ANEU 4.0 02/23/2017    HGB 14.0 03/16/2017    HCT 41.4 03/16/2017    PLT 84 (L) 03/16/2017     02/09/2017    POTASSIUM 4.4 02/09/2017    CHLORIDE 106 02/09/2017    CO2 24 02/09/2017     (H) 02/09/2017    BUN 28 02/09/2017    CR 1.23 02/09/2017    MAG 2.3 09/17/2013    INR 1.05 06/30/2014       ASSESSMENT AND PLAN:  Mr. Stoddard is a 56-year-old gentleman with relapsed Hodgkin lymphoma, status post non-myeloablative umbilical cord blood transplant who is receiving brentuximab chemotherapy for relapsed disease with good response to treatment.      1.  Relapsed Hodgkin lymphoma after allogeneic transplantation.  Prior to starting brentuximab  chemotherapy, his axillary lymphadenopathy was measuring approximately 2.5 to 3 cm, and on today's examination he is found to have no palpable axillary lymph nodes.  He presents for cycle number 5 of brentuximab chemotherapy; however, due to URI symptoms, we decided to delay this cycle for 2 weeks.        His interim imaging demonstrated positive response to treatment with CT scan showing reduction of the size of axillary lymphadenopathy from 3 cm to 1 cm.  The patient tolerated chemotherapy well without any side effects.  He has no peripheral neuropathy symptoms and we have not noticed him to have any neutropenia with chemotherapy, either.       2.  Hematology.  His peripheral blood counts are normal except platelets being 84,000 which partially can be due to chemotherapy and partially due to possible current viral infection.       3.  Infectious issues.  He still presents with new URI symptoms.  We will do respiratory viral PCRs and see if there is any infection such as influenza or RSV that we can treat with antibiotic therapy.  Otherwise, we will continue supportive care for the next 2 weeks before starting cycle number 5 of brentuximab for him.       Of note, he had URI symptoms a month ago consistent with rhinovirus infection as well as suspected bacterial superimposed infection for which he received a course of Levaquin for possible bronchitis or pneumonia with symptom resolution.       4.  Renal.  His kidney function remains normal.       5.  Vnzaf-gtefdo-aqbp disease.  He has had no signs or symptoms of ccoxd-hfbuqf-cspw disease since his transplantation.       The plan is to come in 2 weeks for labs, cycle number 5 of brentuximab, and visit with me in clinic or earlier on an as-needed basis.

## 2017-03-16 NOTE — NURSING NOTE
Chief Complaint   Patient presents with     Blood Draw     Pt with hx of lymphoma labs collected via venipuncture. Pt elected to have iv placed during infusion appt.      RECHECK     Post BMT for [facility] here for labs and provider       Christina Steinberg CMA March 16, 2017 3:13 PM  Medications and allergies reviewed. Face to face time 3 minutes.

## 2017-03-17 LAB
FLUAV H1 2009 PAND RNA SPEC QL NAA+PROBE: NEGATIVE
FLUAV H1 RNA SPEC QL NAA+PROBE: NEGATIVE
FLUAV H3 RNA SPEC QL NAA+PROBE: POSITIVE
FLUAV RNA SPEC QL NAA+PROBE: POSITIVE
FLUBV RNA SPEC QL NAA+PROBE: NEGATIVE
HADV DNA SPEC QL NAA+PROBE: NEGATIVE
HADV DNA SPEC QL NAA+PROBE: NEGATIVE
HMPV RNA SPEC QL NAA+PROBE: NEGATIVE
HPIV1 RNA SPEC QL NAA+PROBE: NEGATIVE
HPIV2 RNA SPEC QL NAA+PROBE: NEGATIVE
HPIV3 RNA SPEC QL NAA+PROBE: NEGATIVE
MICROBIOLOGIST REVIEW: ABNORMAL
RHINOVIRUS RNA SPEC QL NAA+PROBE: NEGATIVE
RSV RNA SPEC QL NAA+PROBE: NEGATIVE
RSV RNA SPEC QL NAA+PROBE: NEGATIVE
SPECIMEN SOURCE: ABNORMAL

## 2017-03-18 RX ORDER — OSELTAMIVIR PHOSPHATE 75 MG/1
75 CAPSULE ORAL 2 TIMES DAILY
Qty: 10 CAPSULE | Refills: 0 | Status: SHIPPED | OUTPATIENT
Start: 2017-03-18 | End: 2017-03-30

## 2017-03-30 ENCOUNTER — INFUSION THERAPY VISIT (OUTPATIENT)
Dept: ONCOLOGY | Facility: CLINIC | Age: 57
End: 2017-03-30
Attending: INTERNAL MEDICINE
Payer: COMMERCIAL

## 2017-03-30 ENCOUNTER — OFFICE VISIT (OUTPATIENT)
Dept: TRANSPLANT | Facility: CLINIC | Age: 57
End: 2017-03-30
Attending: INTERNAL MEDICINE
Payer: COMMERCIAL

## 2017-03-30 VITALS
SYSTOLIC BLOOD PRESSURE: 122 MMHG | OXYGEN SATURATION: 99 % | TEMPERATURE: 97.2 F | HEART RATE: 89 BPM | DIASTOLIC BLOOD PRESSURE: 74 MMHG | WEIGHT: 211.7 LBS | RESPIRATION RATE: 18 BRPM | BODY MASS INDEX: 27.17 KG/M2

## 2017-03-30 DIAGNOSIS — C81.18 NODULAR SCLEROSIS HODGKIN LYMPHOMA OF LYMPH NODES OF MULTIPLE REGIONS (H): Primary | ICD-10-CM

## 2017-03-30 LAB
ALBUMIN SERPL-MCNC: 3.4 G/DL (ref 3.4–5)
ALP SERPL-CCNC: 127 U/L (ref 40–150)
ALT SERPL W P-5'-P-CCNC: 37 U/L (ref 0–70)
ANION GAP SERPL CALCULATED.3IONS-SCNC: 8 MMOL/L (ref 3–14)
AST SERPL W P-5'-P-CCNC: 36 U/L (ref 0–45)
BASOPHILS # BLD AUTO: 0 10E9/L (ref 0–0.2)
BASOPHILS NFR BLD AUTO: 0.5 %
BILIRUB SERPL-MCNC: 0.5 MG/DL (ref 0.2–1.3)
BUN SERPL-MCNC: 25 MG/DL (ref 7–30)
CALCIUM SERPL-MCNC: 8.2 MG/DL (ref 8.5–10.1)
CHLORIDE SERPL-SCNC: 112 MMOL/L (ref 94–109)
CO2 SERPL-SCNC: 25 MMOL/L (ref 20–32)
CREAT SERPL-MCNC: 1.06 MG/DL (ref 0.66–1.25)
DIFFERENTIAL METHOD BLD: ABNORMAL
EOSINOPHIL # BLD AUTO: 0.2 10E9/L (ref 0–0.7)
EOSINOPHIL NFR BLD AUTO: 3.2 %
ERYTHROCYTE [DISTWIDTH] IN BLOOD BY AUTOMATED COUNT: 14.2 % (ref 10–15)
GFR SERPL CREATININE-BSD FRML MDRD: 72 ML/MIN/1.7M2
GLUCOSE SERPL-MCNC: 87 MG/DL (ref 70–99)
HCT VFR BLD AUTO: 36 % (ref 40–53)
HGB BLD-MCNC: 12.4 G/DL (ref 13.3–17.7)
IMM GRANULOCYTES # BLD: 0 10E9/L (ref 0–0.4)
IMM GRANULOCYTES NFR BLD: 0.2 %
LDH SERPL L TO P-CCNC: 271 U/L (ref 85–227)
LYMPHOCYTES # BLD AUTO: 2.7 10E9/L (ref 0.8–5.3)
LYMPHOCYTES NFR BLD AUTO: 43.7 %
MCH RBC QN AUTO: 32.5 PG (ref 26.5–33)
MCHC RBC AUTO-ENTMCNC: 34.4 G/DL (ref 31.5–36.5)
MCV RBC AUTO: 95 FL (ref 78–100)
MONOCYTES # BLD AUTO: 0.6 10E9/L (ref 0–1.3)
MONOCYTES NFR BLD AUTO: 9.1 %
NEUTROPHILS # BLD AUTO: 2.7 10E9/L (ref 1.6–8.3)
NEUTROPHILS NFR BLD AUTO: 43.3 %
NRBC # BLD AUTO: 0 10*3/UL
NRBC BLD AUTO-RTO: 0 /100
PLATELET # BLD AUTO: 120 10E9/L (ref 150–450)
POTASSIUM SERPL-SCNC: 4.2 MMOL/L (ref 3.4–5.3)
PROT SERPL-MCNC: 7 G/DL (ref 6.8–8.8)
RBC # BLD AUTO: 3.81 10E12/L (ref 4.4–5.9)
SODIUM SERPL-SCNC: 145 MMOL/L (ref 133–144)
WBC # BLD AUTO: 6.2 10E9/L (ref 4–11)

## 2017-03-30 PROCEDURE — 25000128 H RX IP 250 OP 636: Mod: ZF | Performed by: INTERNAL MEDICINE

## 2017-03-30 PROCEDURE — 83615 LACTATE (LD) (LDH) ENZYME: CPT | Performed by: INTERNAL MEDICINE

## 2017-03-30 PROCEDURE — 99212 OFFICE O/P EST SF 10 MIN: CPT | Mod: ZF

## 2017-03-30 PROCEDURE — 96413 CHEMO IV INFUSION 1 HR: CPT

## 2017-03-30 PROCEDURE — 80053 COMPREHEN METABOLIC PANEL: CPT | Performed by: INTERNAL MEDICINE

## 2017-03-30 PROCEDURE — 85025 COMPLETE CBC W/AUTO DIFF WBC: CPT | Performed by: INTERNAL MEDICINE

## 2017-03-30 PROCEDURE — 99212 OFFICE O/P EST SF 10 MIN: CPT | Mod: 25

## 2017-03-30 PROCEDURE — 36415 COLL VENOUS BLD VENIPUNCTURE: CPT

## 2017-03-30 RX ADMIN — BRENTUXIMAB VEDOTIN 178 MG: 50 INJECTION, POWDER, LYOPHILIZED, FOR SOLUTION INTRAVENOUS at 16:43

## 2017-03-30 RX ADMIN — SODIUM CHLORIDE 250 ML: 9 INJECTION, SOLUTION INTRAVENOUS at 16:22

## 2017-03-30 ASSESSMENT — PAIN SCALES - GENERAL: PAINLEVEL: NO PAIN (0)

## 2017-03-30 NOTE — NURSING NOTE
Chief Complaint   Patient presents with     Blood Draw     labs drawn from right arm and vitals taken by VALDEZ Carpenter CMA March 30, 2017

## 2017-03-30 NOTE — PATIENT INSTRUCTIONS
Contact Numbers    [facility] Main Line: [phone]  [facility] Triage:  [phone]    Call triage with chills and/or temperature greater than or equal to 100.5, uncontrolled nausea/vomiting, diarrhea, constipation, dizziness, shortness of breath, chest pain, bleeding, unexplained bruising, or any new/concerning symptoms, questions/concerns.     If you are having any concerning symptoms or wish to speak to a provider before your next infusion visit, please call your care coordinator or triage to notify them so we can adequately serve you.       After Hours: [phone]    If after hours, weekends, or holidays, call main hospital  and ask for Oncology doctor on call.         March 2017 Sunday Monday Tuesday Wednesday Thursday Friday Saturday                  1     2     3     4       5     6     7     8     9     10     11       12     13     14     15     16     [facility] D[facility]    2:15 PM   (15 min.)    [facility]   [facility] Draw     [facility] RETURN    3:00 PM   (30 min.)   Amy Ty MD   [facility] Blood and Marrow Transplant 17     18       19     20     21     22     23     24     25       26     27     28     29     30     [facility] DRAW    3:00 PM   (15 min.)    [facility] DRAW   [facility] Draw     [facility] RETURN    3:30 PM   (30 min.)   Amy Ty MD   [facility] Blood and Marrow Transplant     P ONC INFUSION 60    4:00 PM   (60 min.)   [facility] INFUSION   [facility] 31 April 2017 Sunday Monday Tuesday Wednesday Thursday Friday Saturday                                 1       2     3     4     5     6     7     8       9     10     11     12     13     14     15       16     17     18     19     20     [facility] DRAW    3:00 PM   (15 min.)    [facility] D[facility]   [facility] Draw     [facility] RETURN    3:15 PM   (50 min.)   [facility] Salinas PA-C   [facility] 60    4:30 PM   (60 min.)    ONCOLOGY INFUSION   [facility] 21     22       23     24     25     26     27     28     29       30                                               Recent Results (from the past 24 hour(s))   CBC with platelets differential    Collection Time: 03/30/17  2:56 PM   Result Value Ref Range    WBC 6.2 4.0 - 11.0 10e9/L    RBC Count 3.81 (L) 4.4 - 5.9 10e12/L    Hemoglobin 12.4 (L) 13.3 - 17.7 g/dL    Hematocrit 36.0 (L) 40.0 - 53.0 %    MCV 95 78 - 100 fl    MCH 32.5 26.5 - 33.0 pg    MCHC 34.4 31.5 - 36.5 g/dL    RDW 14.2 10.0 - 15.0 %    Platelet Count 120 (L) 150 - 450 10e9/L    Diff Method Automated Method     % Neutrophils 43.3 %    % Lymphocytes 43.7 %    % Monocytes 9.1 %    % Eosinophils 3.2 %    % Basophils 0.5 %    % Immature Granulocytes 0.2 %    Nucleated RBCs 0 0 /100    Absolute Neutrophil 2.7 1.6 - 8.3 10e9/L    Absolute Lymphocytes 2.7 0.8 - 5.3 10e9/L    Absolute Monocytes 0.6 0.0 - 1.3 10e9/L    Absolute Eosinophils 0.2 0.0 - 0.7 10e9/L    Absolute Basophils 0.0 0.0 - 0.2 10e9/L    Abs Immature Granulocytes 0.0 0 - 0.4 10e9/L    Absolute Nucleated RBC 0.0    Comprehensive metabolic panel    Collection Time: 03/30/17  2:56 PM   Result Value Ref Range    Sodium 145 (H) 133 - 144 mmol/L    Potassium 4.2 3.4 - 5.3 mmol/L    Chloride 112 (H) 94 - 109 mmol/L    Carbon Dioxide 25 20 - 32 mmol/L    Anion Gap 8 3 - 14 mmol/L    Glucose 87 70 - 99 mg/dL    Urea Nitrogen 25 7 - 30 mg/dL    Creatinine 1.06 0.66 - 1.25 mg/dL    GFR Estimate 72 >60 mL/min/1.7m2    GFR Estimate If Black 87 >60 mL/min/1.7m2    Calcium 8.2 (L) 8.5 - 10.1 mg/dL    Bilirubin Total 0.5 0.2 - 1.3 mg/dL    Albumin 3.4 3.4 - 5.0 g/dL    Protein Total 7.0 6.8 - 8.8 g/dL    Alkaline Phosphatase 127 40 - 150 U/L    ALT 37 0 - 70 U/L    AST 36 0 - 45 U/L   Lactate Dehydrogenase    Collection Time: 03/30/17  2:56 PM   Result Value Ref Range    Lactate Dehydrogenase 271 (H) 85 - 227 U/L

## 2017-03-30 NOTE — MR AVS SNAPSHOT
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  After hours call the   hospital at [phone] or   [phone]. Ask for the BMT   fellow for pediatric or adult patients           Care Instructions    Please schedule with Karson with labs on 4/20 at 4 pm         Follow-ups after your visit        Your next 10 appointments already scheduled     Apr 20, 2017  3:00 PM CDT   [facility]   [facility] Lab Draw ([facility])    [address] [place] [id number] [phone]            Apr 20, 2017  3:30 PM CDT   (Arrive by 3:15 PM)   Return Visit with Sara Salinas PA-C   [facility] ([facility])    [address] [place] [address] [phone]            Apr 20, 2017  4:30 PM CDT   Infusion 60 with  ONCOLOGY INFUSION,  20 ATC   [facility] ([facility])    [address] [place] [address] [phone]              Who to contact     If you have questions or need follow up information about today's clinic visit or your schedule please contact [facility] BLOOD AND MARROW TRANSPLANT directly at [phone].  Normal or non-critical lab and imaging results will be communicated to  you by MyChart, letter or phone within 4 business days after the clinic has received the results. If you do not hear from us within 7 days, please contact the clinic through [employer] or phone. If you have a critical or abnormal lab result, we will notify you by phone as soon as possible.  Submit refill requests through [employer] or call your pharmacy and they will forward the refill request to us. Please allow 3 business days for your refill to be completed.          Additional Information About Your Visit        [employer] Information     [employer] gives you secure access to your electronic health record. If you see a primary care provider, you can also send messages to your care team and make appointments. If you have questions, please call your primary care clinic.  If you do not have a primary care provider, please call [phone] and they will assist you.        Care EveryWhere ID     This is your Care EveryWhere ID. This could be used by other organizations to access your [facility] medical records  [id number]        Your Vitals Were     Pulse Temperature Respirations Pulse Oximetry BMI (Body Mass Index)       89 97.2  F (36.2  C) (Oral) 18 99% 27.17 kg/m2        Blood Pressure from Last 3 Encounters:   03/30/17 122/74   03/16/17 121/77   02/23/17 117/78    Weight from Last 3 Encounters:   03/30/17 96 kg (211 lb 11.2 oz)   03/16/17 95.8 kg (211 lb 4.8 oz)   02/23/17 97.5 kg (214 lb 14.4 oz)               Recent Review Flowsheet Data     BMT Recent Results Latest Ref Rng & Units 12/15/2016 12/29/2016 1/19/2017 2/9/2017 2/23/2017 3/16/2017 3/30/2017    WBC 4.0 - 11.0 10e9/L 5.5 7.8 9.1 11.9(H) 7.4 4.7 6.2    Hemoglobin 13.3 - 17.7 g/dL 14.4 15.0 14.3 13.4 12.7(L) 14.0 12.4(L)    Platelet Count 150 - 450 10e9/L 86(L) 135(L) 153 185 133(L) 84(L) 120(L)    Neutrophils (Absolute) 1.6 - 8.3 10e9/L 2.6 3.0 4.5 9.5(H) 4.0 1.3(L) 2.7    Sodium 133 - 144 mmol/L 140 140 143 142 - 143 145(H)    Potassium 3.4 - 5.3 mmol/L 3.4 4.0  4.2 4.4 - 4.0 4.2    Chloride 94 - 109 mmol/L 108 107 108 106 - 106 112(H)    Glucose 70 - 99 mg/dL 127(H) 113(H) 93 150(H) - 97 87    Urea Nitrogen 7 - 30 mg/dL 13 24 22 28 - 21 25    Creatinine 0.66 - 1.25 mg/dL 1.04 1.20 1.05 1.23 - 1.16 1.06    Calcium (Total) 8.5 - 10.1 mg/dL 9.0 8.4(L) 8.6 8.9 - 8.2(L) 8.2(L)    Protein (Total) 6.8 - 8.8 g/dL 7.5 7.5 7.4 7.9 - 7.4 7.0    Albumin 3.4 - 5.0 g/dL 3.4 3.6 3.6 3.6 - 3.6 3.4    Alkaline Phosphatase 40 - 150 U/L 142 131 119 160(H) - 149 127    AST 0 - 45 U/L 106(H) 37 43 32 - 60(H) 36    ALT 0 - 70 U/L 128(H) 68 62 49 - 56 37    MCV 78 - 100 fl 91 91 92 92 97 95 95               Primary Care Provider    Md Other Clinic                Thank you!     Thank you for choosing [facility] BLOOD AND MARROW TRANSPLANT  for your care. Our goal is always to provide you with excellent care. Hearing back from our patients is one way we can continue to improve our services. Please take a few minutes to complete the written survey that you may receive in the mail after your visit with us. Thank you!             Your Updated Medication List - Protect others around you: Learn how to safely use, store and throw away your medicines at www.disposemymeds.org.      Notice  As of 3/30/2017 11:59 PM    You have not been prescribed any medications.

## 2017-03-30 NOTE — LETTER
3/30/2017      RE: Brando Stoddard  [address]  [facility] [id number]     Dear Colleague,    Thank you for referring your patient, Brando Stoddard, to the [facility] BLOOD AND MARROW TRANSPLANT. Please see a copy of my visit note below.    [facility] ONCOLOGY CLINIC FOLLOWUP NOTE      CHIEF COMPLAINT:  Brando recovered from recent influenza URI infection.  He no longer has a cough and has no shortness of breath or fevers.      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  He has been tolerating chemotherapy quite well with interim imaging showing positive response to therapy.  His cycle #4 of chemotherapy was delayed due to rhinovirus infection/bronchitis and cycle #5 was delayed 2 weeks ago due to influenza A infection.      INTERVAL HISTORY:  Since his last visit 2 weeks ago, Brando presented with URI symptoms and we identified him having influenza A infection for which he received a 5-day course of Tamiflu treatment.  He reports having resolution of all his symptoms.  He has no fevers, no chills.  He reports that his wife was quite sick with these symptoms; however, his symptoms were lighter, particularly when he started taking Tamiflu.  He has no current cough, no shortness of breath, no chills and no fevers.  He reports having no bleeding issues and has no peripheral neuropathy symptoms either despite receiving so far 4 cycles of brentuximab chemotherapy.  He reports having thinning of his hair and some hair loss since he started chemotherapy but otherwise he has been tolerating it fine.      REVIEW OF SYSTEMS:  A 12-point review of systems was reviewed and was otherwise unremarkable.      PHYSICAL EXAMINATION:   VITAL SIGNS:  His blood  pressure is 122/74, temperature 97.2 Fahrenheit, pulse 89, respiratory rate 18, saturating 99% on room air.  His BMI is 27.   GENERAL:  He is very pleasant, in no apparent distress.   HEAD AND NECK:  Pupils are equally round to light and accommodation.  Oropharynx is clear without lesions.   CARDIAC:  Regular rate and rhythm, no murmurs, rubs or gallops.   ABDOMEN:  Positive bowel sounds, soft, nontender and nondistended.  No palpable hepatosplenomegaly.   EXTREMITIES:  No pedal edema bilaterally.   NEUROLOGIC:  Nonfocal.   LYMPH NODES:  There is no palpable cervical lymphadenopathy on the left.  Very small 1 cm lymph node is palpable in the left axillary area.  No palpable right axillary or inguinal lymphadenopathy bilaterally.      LABORATORY DATA:  Summarized below.   Lab Results   Component Value Date    WBC 6.2 03/30/2017    ANEU 2.7 03/30/2017    HGB 12.4 (L) 03/30/2017    HCT 36.0 (L) 03/30/2017     (L) 03/30/2017     (H) 03/30/2017    POTASSIUM 4.2 03/30/2017    CHLORIDE 112 (H) 03/30/2017    CO2 25 03/30/2017    GLC 87 03/30/2017    BUN 25 03/30/2017    CR 1.06 03/30/2017    MAG 2.3 09/17/2013    INR 1.05 06/30/2014          ASSESSMENT AND PLAN:  Mr. Brando Stoddard is a 56-year-old gentleman with relapsed Hodgkin lymphoma status post non-myeloablative umbilical cord blood transplant, currently receiving brentuximab chemotherapy for progressive disease.      1.  Relapsed Hodgkin lymphoma after allogeneic transplant:  He presents today to start cycle #5 of chemotherapy.  This was delayed for 2 weeks after he was diagnosed with influenza A infection at his last visit.  We reviewed the laboratory results as well as his symptoms, and physical examination results are all within normal limits.  Therefore, there is no contraindication for us to proceed with cycle #5 of brentuximab today.  We will schedule his cycle #6 of chemotherapy 3 weeks from now, as far as he is doing fine and we will plan exit  imaging 6-8 weeks afterwards.  As far as if he achieves complete remission, we will continue watch and wait approach.   2.  Hematology:  His peripheral blood counts are all normal except slight thrombocytopenia likely related to his recent viral infection which is actually improved from 2 weeks ago when it was 84,000 and today is 120,000.   3.  Infectious disease:  Completed Tamiflu for recent influenza A infection without current signs of symptoms.  Previously, about a month ago, he was diagnosed with rhinovirus infection, which was managed supportively as well.  However, cycle #4 chemotherapy was delayed for that.     4.  There is no need for any prophylactic antibiotics at this time.   5.  Renal:  He has normal renal function and eating and drinking fine.   6.  Return to clinic in 3 weeks for cycle #6 of brentuximab chemotherapy and a visit with me the same day on 04/20.       Again, thank you for allowing me to participate in the care of your patient.      Sincerely,    Amy Ty MD

## 2017-03-30 NOTE — PROGRESS NOTES
[facility] ONCOLOGY CLINIC FOLLOWUP NOTE      CHIEF COMPLAINT:  Brando recovered from recent influenza URI infection.  He no longer has a cough and has no shortness of breath or fevers.      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  He has been tolerating chemotherapy quite well with interim imaging showing positive response to therapy.  His cycle #4 of chemotherapy was delayed due to rhinovirus infection/bronchitis and cycle #5 was delayed 2 weeks ago due to influenza A infection.      INTERVAL HISTORY:  Since his last visit 2 weeks ago, Brando presented with URI symptoms and we identified him having influenza A infection for which he received a 5-day course of Tamiflu treatment.  He reports having resolution of all his symptoms.  He has no fevers, no chills.  He reports that his wife was quite sick with these symptoms; however, his symptoms were lighter, particularly when he started taking Tamiflu.  He has no current cough, no shortness of breath, no chills and no fevers.  He reports having no bleeding issues and has no peripheral neuropathy symptoms either despite receiving so far 4 cycles of brentuximab chemotherapy.  He reports having thinning of his hair and some hair loss since he started chemotherapy but otherwise he has been tolerating it fine.      REVIEW OF SYSTEMS:  A 12-point review of systems was reviewed and was otherwise unremarkable.      PHYSICAL EXAMINATION:   VITAL SIGNS:  His blood pressure is 122/74, temperature 97.2 Fahrenheit, pulse 89, respiratory rate 18, saturating 99% on room air.  His BMI is 27.   GENERAL:  He is very pleasant, in no apparent distress.   HEAD AND NECK:  Pupils are equally round to light and accommodation.   Oropharynx is clear without lesions.   CARDIAC:  Regular rate and rhythm, no murmurs, rubs or gallops.   ABDOMEN:  Positive bowel sounds, soft, nontender and nondistended.  No palpable hepatosplenomegaly.   EXTREMITIES:  No pedal edema bilaterally.   NEUROLOGIC:  Nonfocal.   LYMPH NODES:  There is no palpable cervical lymphadenopathy on the left.  Very small 1 cm lymph node is palpable in the left axillary area.  No palpable right axillary or inguinal lymphadenopathy bilaterally.      LABORATORY DATA:  Summarized below.   Lab Results   Component Value Date    WBC 6.2 03/30/2017    ANEU 2.7 03/30/2017    HGB 12.4 (L) 03/30/2017    HCT 36.0 (L) 03/30/2017     (L) 03/30/2017     (H) 03/30/2017    POTASSIUM 4.2 03/30/2017    CHLORIDE 112 (H) 03/30/2017    CO2 25 03/30/2017    GLC 87 03/30/2017    BUN 25 03/30/2017    CR 1.06 03/30/2017    MAG 2.3 09/17/2013    INR 1.05 06/30/2014          ASSESSMENT AND PLAN:  Mr. Brando Stoddard is a 56-year-old gentleman with relapsed Hodgkin lymphoma status post non-myeloablative umbilical cord blood transplant, currently receiving brentuximab chemotherapy for progressive disease.      1.  Relapsed Hodgkin lymphoma after allogeneic transplant:  He presents today to start cycle #5 of chemotherapy.  This was delayed for 2 weeks after he was diagnosed with influenza A infection at his last visit.  We reviewed the laboratory results as well as his symptoms, and physical examination results are all within normal limits.  Therefore, there is no contraindication for us to proceed with cycle #5 of brentuximab today.  We will schedule his cycle #6 of chemotherapy 3 weeks from now, as far as he is doing fine and we will plan exit imaging 6-8 weeks afterwards.  As far as if he achieves complete remission, we will continue watch and wait approach.   2.  Hematology:  His peripheral blood counts are all normal except slight thrombocytopenia likely related to his recent viral  infection which is actually improved from 2 weeks ago when it was 84,000 and today is 120,000.   3.  Infectious disease:  Completed Tamiflu for recent influenza A infection without current signs of symptoms.  Previously, about a month ago, he was diagnosed with rhinovirus infection, which was managed supportively as well.  However, cycle #4 chemotherapy was delayed for that.     4.  There is no need for any prophylactic antibiotics at this time.   5.  Renal:  He has normal renal function and eating and drinking fine.   6.  Return to clinic in 3 weeks for cycle #6 of brentuximab chemotherapy and a visit with me the same day on 04/20.

## 2017-03-30 NOTE — NURSING NOTE
"Brando Stoddard is a 56 year old male who presents for:  Chief Complaint   Patient presents with     Blood Draw     labs drawn from right arm and vitals taken by [facility]     RECHECK     Oncology patient with PTLD (post-transplant lymphoproliferative disorder) - here for provider visit follow up        Initial Vitals:  /74 (BP Location: Left arm, Patient Position: Chair, Cuff Size: Adult Large)  Pulse 89  Temp 97.2  F (36.2  C) (Oral)  Resp 18  Wt 96 kg (211 lb 11.2 oz)  SpO2 99%  BMI 27.17 kg/m2 Estimated body mass index is 27.17 kg/(m^2) as calculated from the following:    Height as of 11/14/16: 1.88 m (6' 2.02\").    Weight as of this encounter: 96 kg (211 lb 11.2 oz).. Body surface area is 2.24 meters squared. BP completed using cuff size: NA (Not Taken)  No Pain (0) No LMP for male patient. Allergies and medications reviewed.     Medications: Medication refills not needed today.  Pharmacy name entered into [employer]:    [facility] [address] [place] [address] [address] [facility] [address]  [facility] [place], [place] - [address] [facility] [place], [place] - [address] [place] [address] [phone]    Comments: vitals taken in lab    6 minutes for nursing intake (face to face time)   Juliane Weldon MA        "

## 2017-03-30 NOTE — MR AVS SNAPSHOT
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     If you are having any concerning symptoms or wish to speak to a provider before your next infusion visit, please call your care coordinator or triage to notify them so we can adequately serve you.       After Hours: [phone]    If after hours, weekends, or holidays, call main hospital  and ask for Oncology doctor on call.         March 2017 Sunday Monday Tuesday Wednesday Thursday Friday Saturday                  1     2     3     4       5     6     7     8     9     10     11       12     13     14     15     16     [facility] D[facility]    2:15 PM   (15 min.)    [facility] D[facility]   [facility] Draw     [facility] RETURN    3:00 PM   (30 min.)   Amy Ty MD   [facility] Blood and Marrow Transplant 17     18       19     20     21     22     23     24     25       26     27     28     29     30     [facility] DRAW    3:00 PM   (15 min.)    [facility] DRAW   [facility] Lab Draw     [facility] RETURN    3:30 PM   (30 min.)   Amy Ty MD   [facility] Blood and Marrow Transplant     [facility] ONC INFUSION 60    4:00 PM   (60 min.)    ONCOLOGY INFUSION   [facility] 31 April 2017 Sunday  Monday Tuesday Wednesday Thursday Friday Saturday                                 1       2     3     4     5     6     7     8       9     10     11     12     13     14     15       16     17     18     19     20     [facility] D[facility]    3:00 PM   (15 min.)    [facility]   [facility] Lab Draw     [facility] RETURN    3:15 PM   (50 min.)   Sara Salinas PA-C   [facility] ONC INFUSION 60    4:30 PM   (60 min.)    ONCOLOGY INFUSION   [facility] 21     22       23     24     25     26     27     28     29       30                                               Recent Results (from the past 24 hour(s))   CBC with platelets differential    Collection Time: 03/30/17  2:56 PM   Result Value Ref Range    WBC 6.2 4.0 - 11.0 10e9/L    RBC Count 3.81 (L) 4.4 - 5.9 10e12/L    Hemoglobin 12.4 (L) 13.3 - 17.7 g/dL    Hematocrit 36.0 (L) 40.0 - 53.0 %    MCV 95 78 - 100 fl    MCH 32.5 26.5 - 33.0 pg    MCHC 34.4 31.5 - 36.5 g/dL    RDW 14.2 10.0 - 15.0 %    Platelet Count 120 (L) 150 - 450 10e9/L    Diff Method Automated Method     % Neutrophils 43.3 %    % Lymphocytes 43.7 %    % Monocytes 9.1 %    % Eosinophils 3.2 %    % Basophils 0.5 %    % Immature Granulocytes 0.2 %    Nucleated RBCs 0 0 /100    Absolute Neutrophil 2.7 1.6 - 8.3 10e9/L    Absolute Lymphocytes 2.7 0.8 - 5.3 10e9/L    Absolute Monocytes 0.6 0.0 - 1.3 10e9/L    Absolute Eosinophils 0.2 0.0 - 0.7 10e9/L    Absolute Basophils 0.0 0.0 - 0.2 10e9/L    Abs Immature Granulocytes 0.0 0 - 0.4 10e9/L    Absolute Nucleated RBC 0.0    Comprehensive metabolic panel    Collection Time: 03/30/17  2:56 PM   Result Value Ref Range    Sodium 145 (H) 133 - 144 mmol/L    Potassium 4.2 3.4 - 5.3 mmol/L    Chloride 112 (H) 94 - 109 mmol/L    Carbon Dioxide 25 20 - 32 mmol/L    Anion Gap 8 3 - 14 mmol/L    Glucose 87 70 - 99 mg/dL    Urea Nitrogen 25 7 - 30 mg/dL    Creatinine 1.06 0.66 - 1.25 mg/dL    GFR  Estimate 72 >60 mL/min/1.7m2    GFR Estimate If Black 87 >60 mL/min/1.7m2    Calcium 8.2 (L) 8.5 - 10.1 mg/dL    Bilirubin Total 0.5 0.2 - 1.3 mg/dL    Albumin 3.4 3.4 - 5.0 g/dL    Protein Total 7.0 6.8 - 8.8 g/dL    Alkaline Phosphatase 127 40 - 150 U/L    ALT 37 0 - 70 U/L    AST 36 0 - 45 U/L   Lactate Dehydrogenase    Collection Time: 03/30/17  2:56 PM   Result Value Ref Range    Lactate Dehydrogenase 271 (H) 85 - 227 U/L               Follow-ups after your visit        Your next 10 appointments already scheduled     Apr 20, 2017  3:00 PM CDT   [facility] LAB DRAW   [facility] Lab Draw ([facility])    [address] [place] [address] [id number] [phone]            Apr 20, 2017  3:30 PM CDT   (Arrive by 3:15 PM)   Return Visit with Sara Salinas PA-C   [facility] Cancer Clinic ([facility])    [address] [place] [id number] [phone]            Apr 20, 2017  4:30 PM CDT   Infusion 60 with  ONCOLOGY INFUSION, UC 20 ATC   [facility] ([facility])    [address] [place] [address] [id number] [phone]              Who to contact     If you have questions or need follow up information about today's clinic visit or your schedule please contact [facility] CANCER [facility] directly at [phone].  Normal or non-critical lab and imaging results will be communicated to you by MyChart, letter or phone within 4 business days after the clinic has received the results. If you do not hear from us within 7 days, please contact the clinic through MyChart or phone. If you have a critical or abnormal lab result, we will notify you by phone as soon as possible.  Submit refill requests through [employer] or call your pharmacy and they will forward the refill request to us. Please allow 3 business days for your refill to  be completed.          Additional Information About Your Visit        [employer] Information     [employer] gives you secure access to your electronic health record. If you see a primary care provider, you can also send messages to your care team and make appointments. If you have questions, please call your primary care clinic.  If you do not have a primary care provider, please call [phone] and they will assist you.        Care EveryWhere ID     This is your Care EveryWhere ID. This could be used by other organizations to access your [facility] medical records  [id number]         Blood Pressure from Last 3 Encounters:   03/30/17 122/74   03/16/17 121/77   02/23/17 117/78    Weight from Last 3 Encounters:   03/30/17 96 kg (211 lb 11.2 oz)   03/16/17 95.8 kg (211 lb 4.8 oz)   02/23/17 97.5 kg (214 lb 14.4 oz)              We Performed the Following     CBC with platelets differential     Comprehensive metabolic panel     Lactate Dehydrogenase     Treatment Conditions        Primary Care Provider    Md Other Clinic                Thank you!     Thank you for choosing [facility]  for your care. Our goal is always to provide you with excellent care. Hearing back from our patients is one way we can continue to improve our services. Please take a few minutes to complete the written survey that you may receive in the mail after your visit with us. Thank you!             Your Updated Medication List - Protect others around you: Learn how to safely use, store and throw away your medicines at www.disposemymeds.org.      Notice  As of 3/30/2017  5:10 PM    You have not been prescribed any medications.

## 2017-03-30 NOTE — PROGRESS NOTES
Infusion Nursing Note:  Brando Stoddard presents today for Cycle 5 Day 1 brentuximab.    Patient seen by provider today: Yes: Dr. Ty    Intravenous Access:  Peripheral IV placed.    Treatment Conditions:  Lab Results   Component Value Date    HGB 12.4 03/30/2017     Lab Results   Component Value Date    WBC 6.2 03/30/2017      Lab Results   Component Value Date    ANEU 2.7 03/30/2017     Lab Results   Component Value Date     03/30/2017      Lab Results   Component Value Date     03/30/2017                   Lab Results   Component Value Date    POTASSIUM 4.2 03/30/2017           Lab Results   Component Value Date    MAG 2.3 09/17/2013            Lab Results   Component Value Date    CR 1.06 03/30/2017                   Lab Results   Component Value Date    MEÑO 8.2 03/30/2017                Lab Results   Component Value Date    BILITOTAL 0.5 03/30/2017           Lab Results   Component Value Date    ALBUMIN 3.4 03/30/2017                    Lab Results   Component Value Date    ALT 37 03/30/2017           Lab Results   Component Value Date    AST 36 03/30/2017     Results reviewed, labs MET treatment parameters, ok to proceed with treatment.      Post Infusion Assessment:  Patient tolerated infusion without incident.  Blood return noted pre and post infusion.  Site patent and intact, free from redness, edema or discomfort.  No evidence of extravasations.  Access discontinued per protocol.    Discharge Plan:   Patient declined prescription refills.  Copy of AVS reviewed with patient and/or family.  Patient will return 4/20/17 for next appointment.  Patient discharged in stable condition accompanied by: self.  Departure Mode: Ambulatory.    Chyna Shabazz RN

## 2017-04-20 ENCOUNTER — INFUSION THERAPY VISIT (OUTPATIENT)
Dept: ONCOLOGY | Facility: CLINIC | Age: 57
End: 2017-04-20
Attending: INTERNAL MEDICINE
Payer: COMMERCIAL

## 2017-04-20 ENCOUNTER — OFFICE VISIT (OUTPATIENT)
Dept: TRANSPLANT | Facility: CLINIC | Age: 57
End: 2017-04-20
Attending: INTERNAL MEDICINE
Payer: COMMERCIAL

## 2017-04-20 VITALS
WEIGHT: 212.2 LBS | BODY MASS INDEX: 27.23 KG/M2 | TEMPERATURE: 97.9 F | OXYGEN SATURATION: 96 % | DIASTOLIC BLOOD PRESSURE: 81 MMHG | HEART RATE: 110 BPM | SYSTOLIC BLOOD PRESSURE: 131 MMHG

## 2017-04-20 DIAGNOSIS — C81.18 NODULAR SCLEROSIS HODGKIN LYMPHOMA OF LYMPH NODES OF MULTIPLE REGIONS (H): ICD-10-CM

## 2017-04-20 DIAGNOSIS — C81.04 NODULAR LYMPHOCYTE PREDOMINANT HODGKIN LYMPHOMA OF LYMPH NODES OF AXILLA (H): Primary | ICD-10-CM

## 2017-04-20 DIAGNOSIS — C81.18 NODULAR SCLEROSIS HODGKIN LYMPHOMA OF LYMPH NODES OF MULTIPLE REGIONS (H): Primary | ICD-10-CM

## 2017-04-20 DIAGNOSIS — C81.04 NODULAR LYMPHOCYTE PREDOMINANT HODGKIN LYMPHOMA OF LYMPH NODES OF AXILLA (H): ICD-10-CM

## 2017-04-20 DIAGNOSIS — C81.90 HODGKIN'S DISEASE (H): ICD-10-CM

## 2017-04-20 LAB
ALBUMIN SERPL-MCNC: 3.7 G/DL (ref 3.4–5)
ALP SERPL-CCNC: 134 U/L (ref 40–150)
ALT SERPL W P-5'-P-CCNC: 46 U/L (ref 0–70)
ANION GAP SERPL CALCULATED.3IONS-SCNC: 8 MMOL/L (ref 3–14)
AST SERPL W P-5'-P-CCNC: 42 U/L (ref 0–45)
BASOPHILS # BLD AUTO: 0.1 10E9/L (ref 0–0.2)
BASOPHILS NFR BLD AUTO: 0.8 %
BILIRUB SERPL-MCNC: 0.6 MG/DL (ref 0.2–1.3)
BUN SERPL-MCNC: 22 MG/DL (ref 7–30)
CALCIUM SERPL-MCNC: 8.7 MG/DL (ref 8.5–10.1)
CHLORIDE SERPL-SCNC: 110 MMOL/L (ref 94–109)
CO2 SERPL-SCNC: 24 MMOL/L (ref 20–32)
CREAT SERPL-MCNC: 1.31 MG/DL (ref 0.66–1.25)
DIFFERENTIAL METHOD BLD: ABNORMAL
EOSINOPHIL # BLD AUTO: 0 10E9/L (ref 0–0.7)
EOSINOPHIL NFR BLD AUTO: 0.5 %
ERYTHROCYTE [DISTWIDTH] IN BLOOD BY AUTOMATED COUNT: 14.6 % (ref 10–15)
GFR SERPL CREATININE-BSD FRML MDRD: 56 ML/MIN/1.7M2
GLUCOSE SERPL-MCNC: 104 MG/DL (ref 70–99)
HCT VFR BLD AUTO: 38.4 % (ref 40–53)
HGB BLD-MCNC: 13.3 G/DL (ref 13.3–17.7)
IMM GRANULOCYTES # BLD: 0 10E9/L (ref 0–0.4)
IMM GRANULOCYTES NFR BLD: 0.3 %
LYMPHOCYTES # BLD AUTO: 2.3 10E9/L (ref 0.8–5.3)
LYMPHOCYTES NFR BLD AUTO: 38.5 %
MCH RBC QN AUTO: 31.8 PG (ref 26.5–33)
MCHC RBC AUTO-ENTMCNC: 34.6 G/DL (ref 31.5–36.5)
MCV RBC AUTO: 92 FL (ref 78–100)
MONOCYTES # BLD AUTO: 0.7 10E9/L (ref 0–1.3)
MONOCYTES NFR BLD AUTO: 11.4 %
NEUTROPHILS # BLD AUTO: 2.9 10E9/L (ref 1.6–8.3)
NEUTROPHILS NFR BLD AUTO: 48.5 %
NRBC # BLD AUTO: 0 10*3/UL
NRBC BLD AUTO-RTO: 0 /100
PLATELET # BLD AUTO: 124 10E9/L (ref 150–450)
POTASSIUM SERPL-SCNC: 3.9 MMOL/L (ref 3.4–5.3)
PROT SERPL-MCNC: 7.4 G/DL (ref 6.8–8.8)
RBC # BLD AUTO: 4.18 10E12/L (ref 4.4–5.9)
SODIUM SERPL-SCNC: 142 MMOL/L (ref 133–144)
WBC # BLD AUTO: 6 10E9/L (ref 4–11)

## 2017-04-20 PROCEDURE — 80053 COMPREHEN METABOLIC PANEL: CPT | Performed by: INTERNAL MEDICINE

## 2017-04-20 PROCEDURE — 25000128 H RX IP 250 OP 636: Mod: ZF | Performed by: INTERNAL MEDICINE

## 2017-04-20 PROCEDURE — 96413 CHEMO IV INFUSION 1 HR: CPT

## 2017-04-20 PROCEDURE — 99212 OFFICE O/P EST SF 10 MIN: CPT | Mod: ZF

## 2017-04-20 PROCEDURE — 99212 OFFICE O/P EST SF 10 MIN: CPT | Mod: 25

## 2017-04-20 PROCEDURE — 36415 COLL VENOUS BLD VENIPUNCTURE: CPT

## 2017-04-20 PROCEDURE — 85025 COMPLETE CBC W/AUTO DIFF WBC: CPT | Performed by: INTERNAL MEDICINE

## 2017-04-20 RX ORDER — MEPERIDINE HYDROCHLORIDE 25 MG/ML
25 INJECTION INTRAMUSCULAR; INTRAVENOUS; SUBCUTANEOUS EVERY 30 MIN PRN
Status: CANCELLED | OUTPATIENT
Start: 2017-04-20

## 2017-04-20 RX ORDER — DIPHENHYDRAMINE HYDROCHLORIDE 50 MG/ML
50 INJECTION INTRAMUSCULAR; INTRAVENOUS
Status: CANCELLED
Start: 2017-04-20

## 2017-04-20 RX ORDER — METHYLPREDNISOLONE SODIUM SUCCINATE 125 MG/2ML
125 INJECTION, POWDER, LYOPHILIZED, FOR SOLUTION INTRAMUSCULAR; INTRAVENOUS
Status: CANCELLED
Start: 2017-04-20

## 2017-04-20 RX ORDER — EPINEPHRINE 0.3 MG/.3ML
0.3 INJECTION SUBCUTANEOUS EVERY 5 MIN PRN
Status: CANCELLED | OUTPATIENT
Start: 2017-04-20

## 2017-04-20 RX ORDER — DIPHENHYDRAMINE HCL 25 MG
50 CAPSULE ORAL
Status: CANCELLED
Start: 2017-04-20

## 2017-04-20 RX ORDER — ALBUTEROL SULFATE 90 UG/1
1-2 AEROSOL, METERED RESPIRATORY (INHALATION)
Status: CANCELLED
Start: 2017-04-20

## 2017-04-20 RX ORDER — ALBUTEROL SULFATE 0.83 MG/ML
2.5 SOLUTION RESPIRATORY (INHALATION)
Status: CANCELLED | OUTPATIENT
Start: 2017-04-20

## 2017-04-20 RX ORDER — LORAZEPAM 2 MG/ML
0.5 INJECTION INTRAMUSCULAR EVERY 4 HOURS PRN
Status: CANCELLED
Start: 2017-04-20

## 2017-04-20 RX ORDER — SODIUM CHLORIDE 9 MG/ML
1000 INJECTION, SOLUTION INTRAVENOUS CONTINUOUS PRN
Status: CANCELLED
Start: 2017-04-20

## 2017-04-20 RX ORDER — ACETAMINOPHEN 325 MG/1
650 TABLET ORAL
Status: CANCELLED
Start: 2017-04-20

## 2017-04-20 RX ADMIN — BRENTUXIMAB VEDOTIN 178 MG: 50 INJECTION, POWDER, LYOPHILIZED, FOR SOLUTION INTRAVENOUS at 16:59

## 2017-04-20 RX ADMIN — SODIUM CHLORIDE 250 ML: 9 INJECTION, SOLUTION INTRAVENOUS at 16:59

## 2017-04-20 NOTE — PROGRESS NOTES
[facility] ONCOLOGY CLINIC FOLLOWUP NOTE      CHIEF COMPLAINT: No complains. Felling well.      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  He has been tolerating chemotherapy quite well with interim imaging showing positive response to therapy.  His cycle #4 of chemotherapy was delayed due to rhinovirus infection/bronchitis and cycle #5 was delayed 2 weeks ago due to influenza A infection.      INTERVAL HISTORY:  Brando Stoddard presents for a visit and to receive cycle #6 of Brentuximab. Reports having mild peripheral neuropathy symptoms with numbness in his feet, no tingling and no burning sensation. No fevers, chills and no new infectious signs or symptoms. No cough or SOB, no diarrhea. No bleeding issues and no pain.     REVIEW OF SYSTEMS:  A 12-point review of systems was reviewed and was otherwise unremarkable.      PHYSICAL EXAMINATION:   VITAL SIGNS: /81  Pulse 110  Temp 97.9  F (36.6  C) (Oral)  Wt 96.3 kg (212 lb 3.2 oz)  SpO2 96%  BMI 27.23 kg/m2  GENERAL:  He is very pleasant, in no apparent distress.   HEAD AND NECK:  Pupils are equally round to light and accommodation.  Oropharynx is clear without lesions.   CARDIAC:  Regular rate and rhythm, no murmurs, rubs or gallops.   ABDOMEN:  Positive bowel sounds, soft, nontender and nondistended.  No palpable hepatosplenomegaly.   EXTREMITIES:  No pedal edema bilaterally.   NEUROLOGIC:  Nonfocal.   LYMPH NODES:  There is no palpable cervical lymphadenopathy on the left.  I was unable to palpate any axillary or inguinal lymphadenopathy bilaterally today.      LABORATORY DATA:    Lab Results   Component Value Date    WBC 6.0 04/20/2017    ANEU 2.9 04/20/2017    HGB 13.3 04/20/2017    HCT  38.4 (L) 04/20/2017     (L) 04/20/2017     04/20/2017    POTASSIUM 3.9 04/20/2017    CHLORIDE 110 (H) 04/20/2017    CO2 24 04/20/2017     (H) 04/20/2017    BUN 22 04/20/2017    CR 1.31 (H) 04/20/2017    MAG 2.3 09/17/2013    INR 1.05 06/30/2014       ASSESSMENT AND PLAN:  Mr. Brando Stoddard is a 56-year-old gentleman with relapsed Hodgkin lymphoma status post non-myeloablative umbilical cord blood transplant, currently receiving brentuximab chemotherapy for progressive disease.      1.  Relapsed Hodgkin lymphoma after allogeneic transplant: Receiving cycle #6 of Brentuximab chemotherapy. Axillary lymphadenopathy is now not palpable and he started having some peripheral neuropathy symptoms. We will hold further cycles at this time given non-curative nature of this chemotherapy.    We plan exit imaging 4-5 weeks after completion of this cycle of chemotherapy.  As far as if he achieves complete remission, we will continue watch and wait approach.   2.  Hematology:  His peripheral blood counts are normal except mild thrombocytopenia.    3.  Infectious disease: No active infections at this time. He has had influenza A (before cycle 5) and rhinovirus (before cycle 4) infections during Brentuximab chemotherapy course.      Return to clinic in 4-5 weeks with labs and exit imaging.     Amy Ty MD

## 2017-04-20 NOTE — NURSING NOTE
"Brando Stoddard is a 56 year old male who presents for:  Chief Complaint   Patient presents with     Blood Draw     vs/labs drawn via vpt from right ac by [facility]     Oncology Clinic Visit     PTLD        Initial Vitals:  /81  Pulse 110  Temp 97.9  F (36.6  C) (Oral)  Wt 96.3 kg (212 lb 3.2 oz)  SpO2 96%  BMI 27.23 kg/m2 Estimated body mass index is 27.23 kg/(m^2) as calculated from the following:    Height as of 11/14/16: 1.88 m (6' 2.02\").    Weight as of this encounter: 96.3 kg (212 lb 3.2 oz).. Body surface area is 2.24 meters squared. BP completed using cuff size: NA (Not Taken)  Data Unavailable No LMP for male patient. Allergies and medications reviewed.     Medications: Medication refills not needed today.  Pharmacy name entered into [employer]:    [facility] [address] [place] [address] [address] AT [facility] [address]  [facility] [place], [place] - [address] [facility] [place], [place] - [address] [facility] [phone]    Comments:     5 minutes for nursing intake (face to face time)   JUAN WALTER CMA        "

## 2017-04-20 NOTE — MR AVS SNAPSHOT
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  After hours call the   hospital at [phone] or   [phone]. Ask for the BMT   fellow for pediatric or adult patients           Care Instructions    RTC with Karson with labs and CT CAP before appointment that day in 5-6 weeks from now         Follow-ups after your visit        Your next 10 appointments already scheduled     Apr 20, 2017  4:30 PM CDT   Infusion 60 with  ONCOLOGY INFUSION, UC 20 ATC   [facility] ([facility] [place])    [address] [place] [address] [id number] [phone]            Jun 01, 2017  2:00 PM CDT   (Arrive by 1:45 PM)   CT CHEST ABDOMEN PELVIS W/O & W CONTRAST with UCCT2   [facility] CT ([facility])    [address] [place] [address] [id number] [phone]           Please bring any scans or X-rays taken at other hospitals, if similar tests were done. Also bring a list of your medicines, including vitamins, minerals and over-the-counter drugs. It is safest to leave personal items at home.  Be sure to tell your doctor:   If you have any allergies.   If there s any chance you are pregnant.   If you are breastfeeding.   If you have any special needs.  You may have contrast for this exam. To prepare:   Do not eat or  drink for 2 hours before your exam. If you need to take medicine, you may take it with small sips of water. (We may ask you to take liquid medicine as well.)   The day before your exam, drink extra fluids at least six 8-ounce glasses (unless your doctor tells you to restrict your fluids).  Patients over 70 or patients with diabetes or kidney problems:   If you haven t had a blood test (creatinine test) within the last 30 days, go to your clinic or Diagnostic Imaging Department for this test.  If you have diabetes:   If your kidney function is normal, continue taking your metformin (Avandamet, Glucophage, Glucovance, Metaglip) on the day of your exam.   If your kidney function is abnormal, wait 48 hours before restarting this medicine.  You will have oral contrast for this exam:   You will drink the contrast at home. Get this from your clinic or Diagnostic Imaging Department. Please follow the directions given.  Please wear loose clothing, such as a sweat suit or jogging clothes. Avoid snaps, zippers and other metal. We may ask you to undress and put on a hospital gown.  If you have any questions, please call the Imaging Department where you will have your exam.            Jun 01, 2017  4:30 PM CDT   Masonic Lab Draw with  MASONIC LAB DRAW   [facility] Masonic Lab Draw ([facility])    [address] [place] [address] [id number] [phone]            Jun 01, 2017  5:00 PM CDT   RETURN ONC with Amy Ty MD   [facility] Blood and Marrow Transplant ([facility])    [address] [place] [address] [id number] [phone]              Future tests that were ordered for you today     Open Future Orders        Priority Expected Expires Ordered    CT Chest Abdomen Pelvis w/o & w Contrast Routine  4/20/2018 4/20/2017            Who to contact     If you have questions or need follow up information about today's clinic visit or your  schedule please contact [facility] BLOOD AND MARROW TRANSPLANT directly at [phone].  Normal or non-critical lab and imaging results will be communicated to you by [employer]hart, letter or phone within 4 business days after the clinic has received the results. If you do not hear from us within 7 days, please contact the clinic through visetot or phone. If you have a critical or abnormal lab result, we will notify you by phone as soon as possible.  Submit refill requests through [employer] or call your pharmacy and they will forward the refill request to us. Please allow 3 business days for your refill to be completed.          Additional Information About Your Visit        Technorati Information     [employer] gives you secure access to your electronic health record. If you see a primary care provider, you can also send messages to your care team and make appointments. If you have questions, please call your primary care clinic.  If you do not have a primary care provider, please call [phone] and they will assist you.        Care EveryWhere ID     This is your Care EveryWhere ID. This could be used by other organizations to access your [facility] medical records  [id number]        Your Vitals Were     Pulse Temperature Pulse Oximetry BMI (Body Mass Index)          110 97.9  F (36.6  C) (Oral) 96% 27.23 kg/m2         Blood Pressure from Last 3 Encounters:   04/20/17 131/81   03/30/17 122/74   03/16/17 121/77    Weight from Last 3 Encounters:   04/20/17 96.3 kg (212 lb 3.2 oz)   03/30/17 96 kg (211 lb 11.2 oz)   03/16/17 95.8 kg (211 lb 4.8 oz)               Recent Review Flowsheet Data     BMT Recent Results Latest Ref Rng & Units 12/29/2016 1/19/2017 2/9/2017 2/23/2017 3/16/2017 3/30/2017 4/20/2017    WBC 4.0 - 11.0 10e9/L 7.8 9.1 11.9(H) 7.4 4.7 6.2 6.0    Hemoglobin 13.3 - 17.7 g/dL 15.0 14.3 13.4 12.7(L) 14.0 12.4(L) 13.3    Platelet Count 150 - 450 10e9/L 135(L) 153 185 133(L) 84(L) 120(L) 124(L)    Neutrophils  (Absolute) 1.6 - 8.3 10e9/L 3.0 4.5 9.5(H) 4.0 1.3(L) 2.7 2.9    Sodium 133 - 144 mmol/L 140 143 142 - 143 145(H) 142    Potassium 3.4 - 5.3 mmol/L 4.0 4.2 4.4 - 4.0 4.2 3.9    Chloride 94 - 109 mmol/L 107 108 106 - 106 112(H) 110(H)    Glucose 70 - 99 mg/dL 113(H) 93 150(H) - 97 87 104(H)    Urea Nitrogen 7 - 30 mg/dL 24 22 28 - 21 25 22    Creatinine 0.66 - 1.25 mg/dL 1.20 1.05 1.23 - 1.16 1.06 1.31(H)    Calcium (Total) 8.5 - 10.1 mg/dL 8.4(L) 8.6 8.9 - 8.2(L) 8.2(L) 8.7    Protein (Total) 6.8 - 8.8 g/dL 7.5 7.4 7.9 - 7.4 7.0 7.4    Albumin 3.4 - 5.0 g/dL 3.6 3.6 3.6 - 3.6 3.4 3.7    Alkaline Phosphatase 40 - 150 U/L 131 119 160(H) - 149 127 134    AST 0 - 45 U/L 37 43 32 - 60(H) 36 42    ALT 0 - 70 U/L 68 62 49 - 56 37 46    MCV 78 - 100 fl 91 92 92 97 95 95 92               Primary Care Provider    Md Other Clinic                Thank you!     Thank you for choosing [facility] BLOOD AND MARROW TRANSPLANT  for your care. Our goal is always to provide you with excellent care. Hearing back from our patients is one way we can continue to improve our services. Please take a few minutes to complete the written survey that you may receive in the mail after your visit with us. Thank you!             Your Updated Medication List - Protect others around you: Learn how to safely use, store and throw away your medicines at www.disposemymeds.org.      Notice  As of 4/20/2017  4:15 PM    You have not been prescribed any medications.

## 2017-04-20 NOTE — NURSING NOTE
Chief Complaint   Patient presents with     Blood Draw     vs/labs drawn via vpt from right ac by cma   See flowsheets.   Bailey Howard, CMA

## 2017-04-20 NOTE — PROGRESS NOTES
Infusion Nursing Note:  Brando Stoddard presents today for cycle 6 day 1 Brentuximab.    Patient seen by provider today: Yes: Dr. Ty   present during visit today: Not Applicable.    Note: Proceed with treatment.    Intravenous Access:  Peripheral IV placed.    Treatment Conditions:  Lab Results   Component Value Date    HGB 13.3 04/20/2017     Lab Results   Component Value Date    WBC 6.0 04/20/2017      Lab Results   Component Value Date    ANEU 2.9 04/20/2017     Lab Results   Component Value Date     04/20/2017      Lab Results   Component Value Date     04/20/2017                   Lab Results   Component Value Date    POTASSIUM 3.9 04/20/2017           Lab Results   Component Value Date    MAG 2.3 09/17/2013            Lab Results   Component Value Date    CR 1.31 04/20/2017                   Lab Results   Component Value Date    MEÑO 8.7 04/20/2017                Lab Results   Component Value Date    BILITOTAL 0.6 04/20/2017           Lab Results   Component Value Date    ALBUMIN 3.7 04/20/2017                    Lab Results   Component Value Date    ALT 46 04/20/2017           Lab Results   Component Value Date    AST 42 04/20/2017     Results reviewed, labs MET treatment parameters, ok to proceed with treatment.      Post Infusion Assessment:  Patient tolerated infusion without incident.  Blood return noted pre and post infusion.  Site patent and intact, free from redness, edema or discomfort.  No evidence of extravasations.  Access discontinued per protocol.    Discharge Plan:   Patient declined prescription refills.  Discharge instructions reviewed with: Patient.  Patient and/or family verbalized understanding of discharge instructions and all questions answered.  Copy of AVS reviewed with patient and/or family.  Patient will return 6/1/17 for next appointment.  Patient discharged in stable condition accompanied by: self.  Departure Mode: Ambulatory.    Stephanie Monique,  RN

## 2017-04-20 NOTE — PATIENT INSTRUCTIONS
Contact Numbers    [facility] Main Line: [phone]  [facility] Triage:  [phone]    Call triage with chills and/or temperature greater than or equal to 100.5, uncontrolled nausea/vomiting, diarrhea, constipation, dizziness, shortness of breath, chest pain, bleeding, unexplained bruising, or any new/concerning symptoms, questions/concerns.     If you are having any concerning symptoms or wish to speak to a provider before your next infusion visit, please call your care coordinator or triage to notify them so we can adequately serve you.       After Hours: [phone]    If after hours, weekends, or holidays, call main hospital  and ask for Oncology doctor on call.         April 2017 Sunday Monday Tuesday Wednesday Thursday Friday Saturday                                 1       2     3     4     5     6     7     8       9     10     11     12     13     14     15       16     17     18     19     20     [facility] MASONIC LAB DRAW    3:30 PM   (15 min.)    MASONIC LAB DRAW   [facility] Lab Draw     [facility] ONC RETURN    4:00 PM   (30 min.)   Amy Ty MD   [facility] Blood and Marrow Transplant     [facility] ONC INFUSION 60    4:30 PM   (60 min.)    ONCOLOGY INFUSION   [facility] Clinic 21     22       23     24     25     26     27     28     29       30                                              May 2017   Bhupinder Monday Tuesday Wednesday Thursday Friday Saturday        1     2     3     4     5     6       7     8     9     10     11     12     13       14     15     16     17     18     19     20       21     22     23     24     25     26     27       28     29     30     31  Happy Birthday!                                 Lab Results:  Recent Results (from the past 12 hour(s))   CBC with platelets differential    Collection Time: 04/20/17  3:33 PM   Result Value Ref Range    WBC 6.0 4.0 - 11.0 10e9/L    RBC Count 4.18 (L) 4.4 - 5.9 10e12/L    Hemoglobin 13.3 13.3 - 17.7 g/dL    Hematocrit 38.4  (L) 40.0 - 53.0 %    MCV 92 78 - 100 fl    MCH 31.8 26.5 - 33.0 pg    MCHC 34.6 31.5 - 36.5 g/dL    RDW 14.6 10.0 - 15.0 %    Platelet Count 124 (L) 150 - 450 10e9/L    Diff Method Automated Method     % Neutrophils 48.5 %    % Lymphocytes 38.5 %    % Monocytes 11.4 %    % Eosinophils 0.5 %    % Basophils 0.8 %    % Immature Granulocytes 0.3 %    Nucleated RBCs 0 0 /100    Absolute Neutrophil 2.9 1.6 - 8.3 10e9/L    Absolute Lymphocytes 2.3 0.8 - 5.3 10e9/L    Absolute Monocytes 0.7 0.0 - 1.3 10e9/L    Absolute Eosinophils 0.0 0.0 - 0.7 10e9/L    Absolute Basophils 0.1 0.0 - 0.2 10e9/L    Abs Immature Granulocytes 0.0 0 - 0.4 10e9/L    Absolute Nucleated RBC 0.0    Comprehensive metabolic panel    Collection Time: 04/20/17  3:33 PM   Result Value Ref Range    Sodium 142 133 - 144 mmol/L    Potassium 3.9 3.4 - 5.3 mmol/L    Chloride 110 (H) 94 - 109 mmol/L    Carbon Dioxide 24 20 - 32 mmol/L    Anion Gap 8 3 - 14 mmol/L    Glucose 104 (H) 70 - 99 mg/dL    Urea Nitrogen 22 7 - 30 mg/dL    Creatinine 1.31 (H) 0.66 - 1.25 mg/dL    GFR Estimate 56 (L) >60 mL/min/1.7m2    GFR Estimate If Black 68 >60 mL/min/1.7m2    Calcium 8.7 8.5 - 10.1 mg/dL    Bilirubin Total 0.6 0.2 - 1.3 mg/dL    Albumin 3.7 3.4 - 5.0 g/dL    Protein Total 7.4 6.8 - 8.8 g/dL    Alkaline Phosphatase 134 40 - 150 U/L    ALT 46 0 - 70 U/L    AST 42 0 - 45 U/L

## 2017-04-20 NOTE — MR AVS SNAPSHOT
After Visit Summary   4/20/2017    Brando Stoddard    MRN: [id number]           Patient Information     Date Of Birth          1960        Visit Information        Provider Department      4/20/2017 4:30 PM UC 20 ATC;  ONCOLOGY INFUSION 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     If you are having any concerning symptoms or wish to speak to a provider before your next infusion visit, please call your care coordinator or triage to notify them so we can adequately serve you.       After Hours: [phone]    If after hours, weekends, or holidays, call main hospital  and ask for Oncology doctor on call.         April 2017 Sunday Monday Tuesday Wednesday Thursday Friday Saturday                                 1       2     3     4     5     6     7     8       9     10     11     12     13     14     15       16     17     18     19     20     [facility] MASONIC LAB DRAW    3:30 PM   (15 min.)   [facility] LAB DRAW   [facility] Lab Draw     [facility] ONC RETURN    4:00 PM   (30 min.)   Amy Ty MD   [facility] Blood and Marrow Transplant     [facility] ONC INFUSION 60    4:30 PM   (60 min.)    ONCOLOGY INFUSION   [facility] Clinic 21     22       23     24     25     26     27     28     29       30                                              May 2017   Bhupinder Monday Tuesday Wednesday Thursday Friday Saturday         1     2     3     4     5     6       7     8     9     10     11     12     13       14     15     16     17     18     19     20       21     22     23     24     25     26     27       28     29     30     31  Happy Birthday!                                 Lab Results:  Recent Results (from the past 12 hour(s))   CBC with platelets differential    Collection Time: 04/20/17  3:33 PM   Result Value Ref Range    WBC 6.0 4.0 - 11.0 10e9/L    RBC Count 4.18 (L) 4.4 - 5.9 10e12/L    Hemoglobin 13.3 13.3 - 17.7 g/dL    Hematocrit 38.4 (L) 40.0 - 53.0 %    MCV 92 78 - 100 fl    MCH 31.8 26.5 - 33.0 pg    MCHC 34.6 31.5 - 36.5 g/dL    RDW 14.6 10.0 - 15.0 %    Platelet Count 124 (L) 150 - 450 10e9/L    Diff Method Automated Method     % Neutrophils 48.5 %    % Lymphocytes 38.5 %    % Monocytes 11.4 %    % Eosinophils 0.5 %    % Basophils 0.8 %    % Immature Granulocytes 0.3 %    Nucleated RBCs 0 0 /100    Absolute Neutrophil 2.9 1.6 - 8.3 10e9/L    Absolute Lymphocytes 2.3 0.8 - 5.3 10e9/L    Absolute Monocytes 0.7 0.0 - 1.3 10e9/L    Absolute Eosinophils 0.0 0.0 - 0.7 10e9/L    Absolute Basophils 0.1 0.0 - 0.2 10e9/L    Abs Immature Granulocytes 0.0 0 - 0.4 10e9/L    Absolute Nucleated RBC 0.0    Comprehensive metabolic panel    Collection Time: 04/20/17  3:33 PM   Result Value Ref Range    Sodium 142 133 - 144 mmol/L    Potassium 3.9 3.4 - 5.3 mmol/L    Chloride 110 (H) 94 - 109 mmol/L    Carbon Dioxide 24 20 - 32 mmol/L    Anion Gap 8 3 - 14 mmol/L    Glucose 104 (H) 70 - 99 mg/dL    Urea Nitrogen 22 7 - 30 mg/dL    Creatinine 1.31 (H) 0.66 - 1.25 mg/dL    GFR Estimate 56 (L) >60 mL/min/1.7m2    GFR Estimate If Black 68 >60 mL/min/1.7m2    Calcium 8.7 8.5 - 10.1 mg/dL    Bilirubin Total 0.6 0.2 - 1.3 mg/dL    Albumin 3.7 3.4 - 5.0 g/dL    Protein Total 7.4 6.8 - 8.8 g/dL    Alkaline Phosphatase 134 40 - 150 U/L    ALT 46 0 - 70 U/L    AST 42 0 - 45 U/L             Follow-ups after your visit        Your next 10  appointments already scheduled     Jun 01, 2017  2:00 PM CDT   (Arrive by 1:45 PM)   CT CHEST ABDOMEN PELVIS W/O & W CONTRAST with UCCT2   [facility] Center CT ([facility])    [address] [address] [phone]           Please bring any scans or X-rays taken at other hospitals, if similar tests were done. Also bring a list of your medicines, including vitamins, minerals and over-the-counter drugs. It is safest to leave personal items at home.  Be sure to tell your doctor:   If you have any allergies.   If there s any chance you are pregnant.   If you are breastfeeding.   If you have any special needs.  You may have contrast for this exam. To prepare:   Do not eat or drink for 2 hours before your exam. If you need to take medicine, you may take it with small sips of water. (We may ask you to take liquid medicine as well.)   The day before your exam, drink extra fluids at least six 8-ounce glasses (unless your doctor tells you to restrict your fluids).  Patients over 70 or patients with diabetes or kidney problems:   If you haven t had a blood test (creatinine test) within the last 30 days, go to your clinic or Diagnostic Imaging Department for this test.  If you have diabetes:   If your kidney function is normal, continue taking your metformin (Avandamet, Glucophage, Glucovance, Metaglip) on the day of your exam.   If your kidney function is abnormal, wait 48 hours before restarting this medicine.  You will have oral contrast for this exam:   You will drink the contrast at home. Get this from your clinic or Diagnostic Imaging Department. Please follow the directions given.  Please wear loose clothing, such as a sweat suit or jogging clothes. Avoid snaps, zippers and other metal. We may ask you to undress and put on a hospital gown.  If you have any questions, please call the Imaging Department where you will have your exam.            Jun 01, 2017   4:30 PM CDT   [facility] Lab Draw with  MASONIC LAB DRAW   [facility] Draw ([facility])    [address] [facility]  [address] [facility] [address] [phone]            Jun 01, 2017  5:00 PM CDT   RETURN ONC with Amy Ty MD   [facility] Blood and Marrow Transplant ([facility])    [address] [address] [phone]              Future tests that were ordered for you today     Open Future Orders        Priority Expected Expires Ordered    CT Chest Abdomen Pelvis w/o & w Contrast Routine  4/20/2018 4/20/2017            Who to contact     If you have questions or need follow up information about today's clinic visit or your schedule please contact [facility] CL[facility] directly at [phone].  Normal or non-critical lab and imaging results will be communicated to you by MyChart, letter or phone within 4 business days after the clinic has received the results. If you do not hear from us within 7 days, please contact the clinic through [employer] or phone. If you have a critical or abnormal lab result, we will notify you by phone as soon as possible.  Submit refill requests through Last Second Tickets or call your pharmacy and they will forward the refill request to us. Please allow 3 business days for your refill to be completed.          Additional Information About Your Visit        MyChart Information     [employer] gives you secure access to your electronic health record. If you see a primary care provider, you can also send messages to your care team and make appointments. If you have questions, please call your primary care clinic.  If you do not have a primary care provider, please call [phone] and they will assist you.        Care EveryWhere ID     This is your Care EveryWhere ID. This could be used by other organizations to access your [facility] medical records  [id number]         Blood Pressure from  Last 3 Encounters:   04/20/17 131/81   03/30/17 122/74   03/16/17 121/77    Weight from Last 3 Encounters:   04/20/17 96.3 kg (212 lb 3.2 oz)   03/30/17 96 kg (211 lb 11.2 oz)   03/16/17 95.8 kg (211 lb 4.8 oz)              We Performed the Following     CBC with platelets differential     Comprehensive metabolic panel     Treatment Conditions        Primary Care Provider    Md Other Clinic                Thank you!     Thank you for choosing [facility] CANCER [facility]  for your care. Our goal is always to provide you with excellent care. Hearing back from our patients is one way we can continue to improve our services. Please take a few minutes to complete the written survey that you may receive in the mail after your visit with us. Thank you!             Your Updated Medication List - Protect others around you: Learn how to safely use, store and throw away your medicines at www.disposemymeds.org.      Notice  As of 4/20/2017  4:39 PM    You have not been prescribed any medications.

## 2017-08-09 DIAGNOSIS — C81.10 NODULAR SCLEROSING HODGKIN'S LYMPHOMA, UNSPECIFIED BODY REGION (H): Primary | ICD-10-CM

## 2019-09-30 ENCOUNTER — HEALTH MAINTENANCE LETTER (OUTPATIENT)
Age: 59
End: 2019-09-30

## 2021-01-15 ENCOUNTER — HEALTH MAINTENANCE LETTER (OUTPATIENT)
Age: 61
End: 2021-01-15

## 2021-10-24 ENCOUNTER — HEALTH MAINTENANCE LETTER (OUTPATIENT)
Age: 61
End: 2021-10-24

## 2022-02-13 ENCOUNTER — HEALTH MAINTENANCE LETTER (OUTPATIENT)
Age: 62
End: 2022-02-13

## 2022-10-15 ENCOUNTER — HEALTH MAINTENANCE LETTER (OUTPATIENT)
Age: 62
End: 2022-10-15

## 2023-03-26 ENCOUNTER — HEALTH MAINTENANCE LETTER (OUTPATIENT)
Age: 63
End: 2023-03-26

## 2025-05-24 ENCOUNTER — HEALTH MAINTENANCE LETTER (OUTPATIENT)
Age: 65
End: 2025-05-24

## 2025-08-19 ENCOUNTER — MEDICAL CORRESPONDENCE (OUTPATIENT)
Dept: TRANSPLANT | Facility: CLINIC | Age: 65
End: 2025-08-19
Payer: COMMERCIAL

## 2025-08-20 ENCOUNTER — PATIENT OUTREACH (OUTPATIENT)
Dept: ONCOLOGY | Facility: CLINIC | Age: 65
End: 2025-08-20
Payer: COMMERCIAL

## 2025-08-20 DIAGNOSIS — C81.90 RECURRENT HODGKIN'S DISEASE (H): Primary | ICD-10-CM
